# Patient Record
Sex: FEMALE | Race: WHITE | NOT HISPANIC OR LATINO
[De-identification: names, ages, dates, MRNs, and addresses within clinical notes are randomized per-mention and may not be internally consistent; named-entity substitution may affect disease eponyms.]

---

## 2017-01-05 ENCOUNTER — APPOINTMENT (OUTPATIENT)
Dept: ENDOCRINOLOGY | Facility: CLINIC | Age: 67
End: 2017-01-05

## 2017-01-05 VITALS
HEART RATE: 87 BPM | DIASTOLIC BLOOD PRESSURE: 74 MMHG | SYSTOLIC BLOOD PRESSURE: 133 MMHG | BODY MASS INDEX: 32.06 KG/M2 | WEIGHT: 181 LBS

## 2017-01-05 DIAGNOSIS — R94.6 ABNORMAL RESULTS OF THYROID FUNCTION STUDIES: ICD-10-CM

## 2017-01-05 RX ORDER — DEXAMETHASONE 1 MG/1
1 TABLET ORAL
Qty: 1 | Refills: 0 | Status: ACTIVE | COMMUNITY
Start: 2017-01-05 | End: 1900-01-01

## 2017-03-02 ENCOUNTER — RX RENEWAL (OUTPATIENT)
Age: 67
End: 2017-03-02

## 2017-03-03 ENCOUNTER — RX RENEWAL (OUTPATIENT)
Age: 67
End: 2017-03-03

## 2017-04-17 ENCOUNTER — RX RENEWAL (OUTPATIENT)
Age: 67
End: 2017-04-17

## 2017-04-25 ENCOUNTER — RX RENEWAL (OUTPATIENT)
Age: 67
End: 2017-04-25

## 2017-08-07 ENCOUNTER — RX RENEWAL (OUTPATIENT)
Age: 67
End: 2017-08-07

## 2017-08-10 ENCOUNTER — CHART COPY (OUTPATIENT)
Age: 67
End: 2017-08-10

## 2017-09-25 ENCOUNTER — RX RENEWAL (OUTPATIENT)
Age: 67
End: 2017-09-25

## 2017-10-25 ENCOUNTER — CHART COPY (OUTPATIENT)
Age: 67
End: 2017-10-25

## 2017-10-26 ENCOUNTER — CHART COPY (OUTPATIENT)
Age: 67
End: 2017-10-26

## 2017-10-26 ENCOUNTER — RX RENEWAL (OUTPATIENT)
Age: 67
End: 2017-10-26

## 2017-11-02 ENCOUNTER — RX RENEWAL (OUTPATIENT)
Age: 67
End: 2017-11-02

## 2017-12-21 ENCOUNTER — APPOINTMENT (OUTPATIENT)
Dept: ENDOCRINOLOGY | Facility: CLINIC | Age: 67
End: 2017-12-21
Payer: COMMERCIAL

## 2017-12-21 VITALS
WEIGHT: 180 LBS | HEIGHT: 63 IN | SYSTOLIC BLOOD PRESSURE: 132 MMHG | DIASTOLIC BLOOD PRESSURE: 85 MMHG | BODY MASS INDEX: 31.89 KG/M2 | HEART RATE: 82 BPM

## 2017-12-21 DIAGNOSIS — R63.5 ABNORMAL WEIGHT GAIN: ICD-10-CM

## 2017-12-21 DIAGNOSIS — L65.9 NONSCARRING HAIR LOSS, UNSPECIFIED: ICD-10-CM

## 2017-12-21 DIAGNOSIS — E03.9 HYPOTHYROIDISM, UNSPECIFIED: ICD-10-CM

## 2017-12-21 PROCEDURE — 99214 OFFICE O/P EST MOD 30 MIN: CPT | Mod: 25

## 2017-12-21 PROCEDURE — 83036 HEMOGLOBIN GLYCOSYLATED A1C: CPT | Mod: QW

## 2017-12-21 PROCEDURE — 82962 GLUCOSE BLOOD TEST: CPT

## 2017-12-21 RX ORDER — METFORMIN HYDROCHLORIDE 500 MG/1
500 TABLET, FILM COATED, EXTENDED RELEASE ORAL
Qty: 360 | Refills: 3 | Status: DISCONTINUED | COMMUNITY
Start: 2017-03-02 | End: 2017-12-21

## 2017-12-21 RX ORDER — METFORMIN ER 500 MG 500 MG/1
500 TABLET ORAL
Qty: 360 | Refills: 3 | Status: DISCONTINUED | COMMUNITY
Start: 2017-04-25 | End: 2017-12-21

## 2018-03-12 ENCOUNTER — RX RENEWAL (OUTPATIENT)
Age: 68
End: 2018-03-12

## 2018-12-24 ENCOUNTER — RX RENEWAL (OUTPATIENT)
Age: 68
End: 2018-12-24

## 2019-01-19 ENCOUNTER — RX RENEWAL (OUTPATIENT)
Age: 69
End: 2019-01-19

## 2019-02-04 ENCOUNTER — RX RENEWAL (OUTPATIENT)
Age: 69
End: 2019-02-04

## 2020-01-15 ENCOUNTER — RX RENEWAL (OUTPATIENT)
Age: 70
End: 2020-01-15

## 2020-02-12 ENCOUNTER — RX RENEWAL (OUTPATIENT)
Age: 70
End: 2020-02-12

## 2020-02-28 ENCOUNTER — RX RENEWAL (OUTPATIENT)
Age: 70
End: 2020-02-28

## 2020-02-28 RX ORDER — METFORMIN HYDROCHLORIDE 500 MG/1
500 TABLET, FILM COATED, EXTENDED RELEASE ORAL
Qty: 360 | Refills: 3 | Status: ACTIVE | COMMUNITY
Start: 2017-10-26 | End: 1900-01-01

## 2021-01-14 ENCOUNTER — TRANSCRIPTION ENCOUNTER (OUTPATIENT)
Age: 71
End: 2021-01-14

## 2021-01-20 ENCOUNTER — RX RENEWAL (OUTPATIENT)
Age: 71
End: 2021-01-20

## 2021-02-11 ENCOUNTER — TRANSCRIPTION ENCOUNTER (OUTPATIENT)
Age: 71
End: 2021-02-11

## 2024-03-25 ENCOUNTER — APPOINTMENT (OUTPATIENT)
Dept: OTOLARYNGOLOGY | Facility: CLINIC | Age: 74
End: 2024-03-25

## 2024-04-08 ENCOUNTER — APPOINTMENT (OUTPATIENT)
Dept: ORTHOPEDIC SURGERY | Facility: CLINIC | Age: 74
End: 2024-04-08
Payer: MEDICARE

## 2024-04-08 VITALS
DIASTOLIC BLOOD PRESSURE: 84 MMHG | SYSTOLIC BLOOD PRESSURE: 132 MMHG | WEIGHT: 170 LBS | HEART RATE: 86 BPM | BODY MASS INDEX: 31.28 KG/M2 | HEIGHT: 62 IN

## 2024-04-08 DIAGNOSIS — M25.551 PAIN IN RIGHT HIP: ICD-10-CM

## 2024-04-08 PROCEDURE — 73502 X-RAY EXAM HIP UNI 2-3 VIEWS: CPT

## 2024-04-08 PROCEDURE — 99204 OFFICE O/P NEW MOD 45 MIN: CPT

## 2024-04-10 NOTE — DISCUSSION/SUMMARY
[de-identified] : PT is indicated for JOSE.  Pt wishes to undergo the surgery in May.  DA vs post approach discussed.  She understands she will be getting a posterior approach.  she will attend joint class  Benefits and alternatives of the procedure discussed with the patient in detail. Risks including but not limited to bleeding, infection, damage to nerves, damage to tissues, damage to blood vessels, nonunion, malunion, post surgical stiffness, need for future surgery, DVT, PE, loss of limb and loss of life were explained.  Benefits of surgery include anatomic restoration  of length alignment and rotation, and best chance for better long term functional outcomes. The patient understands the risks and benefits and wishes to undergo surgery. They will be scheduled appropriately.

## 2024-04-10 NOTE — HISTORY OF PRESENT ILLNESS
[de-identified] : Patient is a 74 year-old female who presents to the office today for initial evaluation of right hip pain. Works as a . She has been complaining of right hip pain for many years. She notes that in the absence of injury or inciting event, she has had a significant increase in pain in the hip over the past 2 months. Pain is located in the groin and also in the posterior aspect of the hip/lower back. She notes that she has trouble ambulating due to the pain and has a limp. She is currently taking Diclofenac which has not been helping at all. The Tylenol/Advil combination medication helped her symptoms a little bit. She presents on referral from Dr. Giron for right hip pain consultation.

## 2024-04-10 NOTE — PHYSICAL EXAM
[de-identified] : + ttp at R groin and GT.  AMbulates w antalgic gait.  Painful and limited IR.  limited but painless ER.  Able to SLR and abduct w equalk strength b/l.  NVI [de-identified] : 2 views of the R hip show severe R hip arthritis with complete loss of joint space and osteophyte formation

## 2024-05-06 ENCOUNTER — OUTPATIENT (OUTPATIENT)
Dept: OUTPATIENT SERVICES | Facility: HOSPITAL | Age: 74
LOS: 1 days | End: 2024-05-06
Payer: MEDICARE

## 2024-05-06 VITALS
RESPIRATION RATE: 16 BRPM | SYSTOLIC BLOOD PRESSURE: 116 MMHG | HEIGHT: 62 IN | HEART RATE: 81 BPM | DIASTOLIC BLOOD PRESSURE: 79 MMHG | TEMPERATURE: 99 F | WEIGHT: 171.08 LBS | OXYGEN SATURATION: 96 %

## 2024-05-06 DIAGNOSIS — Z98.890 OTHER SPECIFIED POSTPROCEDURAL STATES: Chronic | ICD-10-CM

## 2024-05-06 DIAGNOSIS — E11.9 TYPE 2 DIABETES MELLITUS WITHOUT COMPLICATIONS: ICD-10-CM

## 2024-05-06 DIAGNOSIS — I10 ESSENTIAL (PRIMARY) HYPERTENSION: ICD-10-CM

## 2024-05-06 DIAGNOSIS — Z86.39 PERSONAL HISTORY OF OTHER ENDOCRINE, NUTRITIONAL AND METABOLIC DISEASE: ICD-10-CM

## 2024-05-06 DIAGNOSIS — M16.11 UNILATERAL PRIMARY OSTEOARTHRITIS, RIGHT HIP: ICD-10-CM

## 2024-05-06 DIAGNOSIS — J30.2 OTHER SEASONAL ALLERGIC RHINITIS: ICD-10-CM

## 2024-05-06 LAB
ANION GAP SERPL CALC-SCNC: 14 MMOL/L — SIGNIFICANT CHANGE UP (ref 5–17)
BLD GP AB SCN SERPL QL: NEGATIVE — SIGNIFICANT CHANGE UP
BUN SERPL-MCNC: 18 MG/DL — SIGNIFICANT CHANGE UP (ref 7–23)
CALCIUM SERPL-MCNC: 10.7 MG/DL — HIGH (ref 8.4–10.5)
CHLORIDE SERPL-SCNC: 102 MMOL/L — SIGNIFICANT CHANGE UP (ref 96–108)
CO2 SERPL-SCNC: 23 MMOL/L — SIGNIFICANT CHANGE UP (ref 22–31)
CREAT SERPL-MCNC: 0.84 MG/DL — SIGNIFICANT CHANGE UP (ref 0.5–1.3)
EGFR: 74 ML/MIN/1.73M2 — SIGNIFICANT CHANGE UP
GLUCOSE SERPL-MCNC: 96 MG/DL — SIGNIFICANT CHANGE UP (ref 70–99)
HCT VFR BLD CALC: 44.8 % — SIGNIFICANT CHANGE UP (ref 34.5–45)
HGB BLD-MCNC: 14.8 G/DL — SIGNIFICANT CHANGE UP (ref 11.5–15.5)
MCHC RBC-ENTMCNC: 29.5 PG — SIGNIFICANT CHANGE UP (ref 27–34)
MCHC RBC-ENTMCNC: 33 GM/DL — SIGNIFICANT CHANGE UP (ref 32–36)
MCV RBC AUTO: 89.2 FL — SIGNIFICANT CHANGE UP (ref 80–100)
NRBC # BLD: 0 /100 WBCS — SIGNIFICANT CHANGE UP (ref 0–0)
PLATELET # BLD AUTO: 233 K/UL — SIGNIFICANT CHANGE UP (ref 150–400)
POTASSIUM SERPL-MCNC: 4.7 MMOL/L — SIGNIFICANT CHANGE UP (ref 3.5–5.3)
POTASSIUM SERPL-SCNC: 4.7 MMOL/L — SIGNIFICANT CHANGE UP (ref 3.5–5.3)
RBC # BLD: 5.02 M/UL — SIGNIFICANT CHANGE UP (ref 3.8–5.2)
RBC # FLD: 11.9 % — SIGNIFICANT CHANGE UP (ref 10.3–14.5)
RH IG SCN BLD-IMP: POSITIVE — SIGNIFICANT CHANGE UP
SODIUM SERPL-SCNC: 139 MMOL/L — SIGNIFICANT CHANGE UP (ref 135–145)
WBC # BLD: 10.17 K/UL — SIGNIFICANT CHANGE UP (ref 3.8–10.5)
WBC # FLD AUTO: 10.17 K/UL — SIGNIFICANT CHANGE UP (ref 3.8–10.5)

## 2024-05-06 RX ORDER — CEFAZOLIN SODIUM 1 G
2000 VIAL (EA) INJECTION ONCE
Refills: 0 | Status: COMPLETED | OUTPATIENT
Start: 2024-05-14 | End: 2024-05-14

## 2024-05-06 RX ORDER — PANTOPRAZOLE SODIUM 20 MG/1
40 TABLET, DELAYED RELEASE ORAL ONCE
Refills: 0 | Status: COMPLETED | OUTPATIENT
Start: 2024-05-14 | End: 2024-05-14

## 2024-05-06 RX ORDER — TRAMADOL HYDROCHLORIDE 50 MG/1
50 TABLET ORAL ONCE
Refills: 0 | Status: DISCONTINUED | OUTPATIENT
Start: 2024-05-14 | End: 2024-05-14

## 2024-05-06 RX ORDER — CHLORHEXIDINE GLUCONATE 213 G/1000ML
1 SOLUTION TOPICAL ONCE
Refills: 0 | Status: DISCONTINUED | OUTPATIENT
Start: 2024-05-14 | End: 2024-05-14

## 2024-05-06 RX ORDER — SODIUM CHLORIDE 9 MG/ML
3 INJECTION INTRAMUSCULAR; INTRAVENOUS; SUBCUTANEOUS EVERY 8 HOURS
Refills: 0 | Status: DISCONTINUED | OUTPATIENT
Start: 2024-05-14 | End: 2024-05-14

## 2024-05-06 RX ORDER — LIDOCAINE HCL 20 MG/ML
0.2 VIAL (ML) INJECTION ONCE
Refills: 0 | Status: DISCONTINUED | OUTPATIENT
Start: 2024-05-14 | End: 2024-05-14

## 2024-05-06 NOTE — H&P PST ADULT - PROBLEM SELECTOR PLAN 1
scheduled right total hip arthroplasty, posterior approach on 5/14/2024.  -preop and chlorhexidine instructions given  -Labs: CBC, BMP, A1c, MRSA, T&S done in PST  -Requesting M/E from Dr. Finn Giron, appt scheduled on 5/7 for cardiac testing with PCP/CArd

## 2024-05-06 NOTE — H&P PST ADULT - ASSESSMENT
DASI score: 7  DASI activity: Pt states she is able to walk 1-2 blocks, can climb 1-2 FOS w/oit s/s of C/P or SOB  Loose teeth or denture: denies

## 2024-05-06 NOTE — H&P PST ADULT - NSICDXPASTMEDICALHX_GEN_ALL_CORE_FT
PAST MEDICAL HISTORY:  Anxiety     DM (diabetes mellitus), type 2     H/O Hashimoto thyroiditis     History of glaucoma     HTN (hypertension)     Seasonal allergies

## 2024-05-06 NOTE — H&P PST ADULT - HISTORY OF PRESENT ILLNESS
74 yr old female with PMH of  74 yr old female with PMH of HTN, DM type 2, Hashimoto's thyroiditis, seasonal allergies, anxiety. Pt reports right hip pain ~2 yr, pain with sitting long period of time, and standing. Pain scale 9/10, with OTC use 6/10. Pt evaluated by Dr. Hayward for a scheduled right total hip arthroplasty, posterior approach on 5/14/2024. Pt denies fevers, chills, h/a, c/p or SOB at this time.

## 2024-05-07 LAB
A1C WITH ESTIMATED AVERAGE GLUCOSE RESULT: 6.9 % — HIGH (ref 4–5.6)
ESTIMATED AVERAGE GLUCOSE: 151 MG/DL — HIGH (ref 68–114)
MRSA PCR RESULT.: SIGNIFICANT CHANGE UP
S AUREUS DNA NOSE QL NAA+PROBE: SIGNIFICANT CHANGE UP

## 2024-05-13 ENCOUNTER — TRANSCRIPTION ENCOUNTER (OUTPATIENT)
Age: 74
End: 2024-05-13

## 2024-05-14 ENCOUNTER — RESULT REVIEW (OUTPATIENT)
Age: 74
End: 2024-05-14

## 2024-05-14 ENCOUNTER — OUTPATIENT (OUTPATIENT)
Dept: INPATIENT UNIT | Facility: HOSPITAL | Age: 74
LOS: 1 days | End: 2024-05-14
Payer: MEDICARE

## 2024-05-14 ENCOUNTER — APPOINTMENT (OUTPATIENT)
Dept: ORTHOPEDIC SURGERY | Facility: HOSPITAL | Age: 74
End: 2024-05-14

## 2024-05-14 ENCOUNTER — TRANSCRIPTION ENCOUNTER (OUTPATIENT)
Age: 74
End: 2024-05-14

## 2024-05-14 VITALS
HEIGHT: 61.81 IN | OXYGEN SATURATION: 97 % | RESPIRATION RATE: 18 BRPM | DIASTOLIC BLOOD PRESSURE: 77 MMHG | SYSTOLIC BLOOD PRESSURE: 122 MMHG | WEIGHT: 171.08 LBS | TEMPERATURE: 97 F | HEART RATE: 82 BPM

## 2024-05-14 DIAGNOSIS — Z98.890 OTHER SPECIFIED POSTPROCEDURAL STATES: Chronic | ICD-10-CM

## 2024-05-14 DIAGNOSIS — M16.11 UNILATERAL PRIMARY OSTEOARTHRITIS, RIGHT HIP: ICD-10-CM

## 2024-05-14 LAB
GLUCOSE BLDC GLUCOMTR-MCNC: 133 MG/DL — HIGH (ref 70–99)
GLUCOSE BLDC GLUCOMTR-MCNC: 165 MG/DL — HIGH (ref 70–99)
GLUCOSE BLDC GLUCOMTR-MCNC: 169 MG/DL — HIGH (ref 70–99)
GLUCOSE BLDC GLUCOMTR-MCNC: 286 MG/DL — HIGH (ref 70–99)
RH IG SCN BLD-IMP: POSITIVE — SIGNIFICANT CHANGE UP

## 2024-05-14 PROCEDURE — 86901 BLOOD TYPING SEROLOGIC RH(D): CPT

## 2024-05-14 PROCEDURE — 86850 RBC ANTIBODY SCREEN: CPT

## 2024-05-14 PROCEDURE — 83036 HEMOGLOBIN GLYCOSYLATED A1C: CPT

## 2024-05-14 PROCEDURE — G0463: CPT

## 2024-05-14 PROCEDURE — 86900 BLOOD TYPING SEROLOGIC ABO: CPT

## 2024-05-14 PROCEDURE — 87640 STAPH A DNA AMP PROBE: CPT

## 2024-05-14 PROCEDURE — 72170 X-RAY EXAM OF PELVIS: CPT

## 2024-05-14 PROCEDURE — 80048 BASIC METABOLIC PNL TOTAL CA: CPT

## 2024-05-14 PROCEDURE — 87641 MR-STAPH DNA AMP PROBE: CPT

## 2024-05-14 PROCEDURE — 72170 X-RAY EXAM OF PELVIS: CPT | Mod: 26

## 2024-05-14 PROCEDURE — 85027 COMPLETE CBC AUTOMATED: CPT

## 2024-05-14 DEVICE — HEAD DELTA CER V40 36 PLUS 5: Type: IMPLANTABLE DEVICE | Site: RIGHT | Status: FUNCTIONAL

## 2024-05-14 DEVICE — LINER RIM ELEVATED TRIDENT X3 5.9X36MM: Type: IMPLANTABLE DEVICE | Site: RIGHT | Status: FUNCTIONAL

## 2024-05-14 DEVICE — SHELL ACET TRIDENT II E 52MM: Type: IMPLANTABLE DEVICE | Site: RIGHT | Status: FUNCTIONAL

## 2024-05-14 DEVICE — SCREW HEX LO PROF 6.5X25MM: Type: IMPLANTABLE DEVICE | Site: RIGHT | Status: FUNCTIONAL

## 2024-05-14 DEVICE — STEM ACC V40 127 DEG SZ 5 35X108MM 127 DEG: Type: IMPLANTABLE DEVICE | Site: RIGHT | Status: FUNCTIONAL

## 2024-05-14 RX ORDER — MAGNESIUM HYDROXIDE 400 MG/1
30 TABLET, CHEWABLE ORAL DAILY
Refills: 0 | Status: DISCONTINUED | OUTPATIENT
Start: 2024-05-14 | End: 2024-05-15

## 2024-05-14 RX ORDER — MONTELUKAST 4 MG/1
10 TABLET, CHEWABLE ORAL DAILY
Refills: 0 | Status: DISCONTINUED | OUTPATIENT
Start: 2024-05-14 | End: 2024-05-15

## 2024-05-14 RX ORDER — DEXTROSE 50 % IN WATER 50 %
25 SYRINGE (ML) INTRAVENOUS ONCE
Refills: 0 | Status: DISCONTINUED | OUTPATIENT
Start: 2024-05-14 | End: 2024-05-15

## 2024-05-14 RX ORDER — ALBUTEROL 90 UG/1
2 AEROSOL, METERED ORAL EVERY 4 HOURS
Refills: 0 | Status: DISCONTINUED | OUTPATIENT
Start: 2024-05-14 | End: 2024-05-15

## 2024-05-14 RX ORDER — ALBUTEROL 90 UG/1
2 AEROSOL, METERED ORAL
Refills: 0 | DISCHARGE

## 2024-05-14 RX ORDER — TIMOLOL 0.5 %
1 DROPS OPHTHALMIC (EYE)
Refills: 0 | DISCHARGE

## 2024-05-14 RX ORDER — OXYCODONE HYDROCHLORIDE 5 MG/1
5 TABLET ORAL EVERY 4 HOURS
Refills: 0 | Status: DISCONTINUED | OUTPATIENT
Start: 2024-05-14 | End: 2024-05-15

## 2024-05-14 RX ORDER — INSULIN LISPRO 100/ML
VIAL (ML) SUBCUTANEOUS AT BEDTIME
Refills: 0 | Status: DISCONTINUED | OUTPATIENT
Start: 2024-05-14 | End: 2024-05-15

## 2024-05-14 RX ORDER — SODIUM CHLORIDE 9 MG/ML
500 INJECTION, SOLUTION INTRAVENOUS ONCE
Refills: 0 | Status: DISCONTINUED | OUTPATIENT
Start: 2024-05-14 | End: 2024-05-14

## 2024-05-14 RX ORDER — AMLODIPINE BESYLATE 2.5 MG/1
10 TABLET ORAL DAILY
Refills: 0 | Status: DISCONTINUED | OUTPATIENT
Start: 2024-05-14 | End: 2024-05-15

## 2024-05-14 RX ORDER — ONDANSETRON 8 MG/1
4 TABLET, FILM COATED ORAL ONCE
Refills: 0 | Status: DISCONTINUED | OUTPATIENT
Start: 2024-05-14 | End: 2024-05-14

## 2024-05-14 RX ORDER — SENNA PLUS 8.6 MG/1
2 TABLET ORAL AT BEDTIME
Refills: 0 | Status: DISCONTINUED | OUTPATIENT
Start: 2024-05-14 | End: 2024-05-15

## 2024-05-14 RX ORDER — LEVOTHYROXINE SODIUM 125 MCG
100 TABLET ORAL DAILY
Refills: 0 | Status: DISCONTINUED | OUTPATIENT
Start: 2024-05-14 | End: 2024-05-15

## 2024-05-14 RX ORDER — DEXTROSE 50 % IN WATER 50 %
15 SYRINGE (ML) INTRAVENOUS ONCE
Refills: 0 | Status: DISCONTINUED | OUTPATIENT
Start: 2024-05-14 | End: 2024-05-15

## 2024-05-14 RX ORDER — SODIUM CHLORIDE 9 MG/ML
500 INJECTION INTRAMUSCULAR; INTRAVENOUS; SUBCUTANEOUS ONCE
Refills: 0 | Status: COMPLETED | OUTPATIENT
Start: 2024-05-14 | End: 2024-05-14

## 2024-05-14 RX ORDER — ERTUGLIFLOZIN AND METFORMIN HYDROCHLORIDE 7.5; 1 MG/1; MG/1
1 TABLET, FILM COATED ORAL
Refills: 0 | DISCHARGE

## 2024-05-14 RX ORDER — OXYCODONE HYDROCHLORIDE 5 MG/1
10 TABLET ORAL
Refills: 0 | Status: DISCONTINUED | OUTPATIENT
Start: 2024-05-14 | End: 2024-05-14

## 2024-05-14 RX ORDER — SODIUM CHLORIDE 9 MG/ML
500 INJECTION INTRAMUSCULAR; INTRAVENOUS; SUBCUTANEOUS ONCE
Refills: 0 | Status: COMPLETED | OUTPATIENT
Start: 2024-05-15 | End: 2024-05-15

## 2024-05-14 RX ORDER — OXYCODONE HYDROCHLORIDE 5 MG/1
5 TABLET ORAL
Refills: 0 | Status: DISCONTINUED | OUTPATIENT
Start: 2024-05-14 | End: 2024-05-14

## 2024-05-14 RX ORDER — HYDROMORPHONE HYDROCHLORIDE 2 MG/ML
0.25 INJECTION INTRAMUSCULAR; INTRAVENOUS; SUBCUTANEOUS
Refills: 0 | Status: DISCONTINUED | OUTPATIENT
Start: 2024-05-14 | End: 2024-05-14

## 2024-05-14 RX ORDER — PANTOPRAZOLE SODIUM 20 MG/1
40 TABLET, DELAYED RELEASE ORAL
Refills: 0 | Status: DISCONTINUED | OUTPATIENT
Start: 2024-05-14 | End: 2024-05-15

## 2024-05-14 RX ORDER — SODIUM CHLORIDE 9 MG/ML
1000 INJECTION, SOLUTION INTRAVENOUS
Refills: 0 | Status: DISCONTINUED | OUTPATIENT
Start: 2024-05-14 | End: 2024-05-14

## 2024-05-14 RX ORDER — LEVOTHYROXINE SODIUM 125 MCG
1 TABLET ORAL
Refills: 0 | DISCHARGE

## 2024-05-14 RX ORDER — DEXTROSE 50 % IN WATER 50 %
12.5 SYRINGE (ML) INTRAVENOUS ONCE
Refills: 0 | Status: DISCONTINUED | OUTPATIENT
Start: 2024-05-14 | End: 2024-05-15

## 2024-05-14 RX ORDER — INSULIN LISPRO 100/ML
VIAL (ML) SUBCUTANEOUS
Refills: 0 | Status: DISCONTINUED | OUTPATIENT
Start: 2024-05-14 | End: 2024-05-15

## 2024-05-14 RX ORDER — MONTELUKAST 4 MG/1
1 TABLET, CHEWABLE ORAL
Refills: 0 | DISCHARGE

## 2024-05-14 RX ORDER — OXYCODONE HYDROCHLORIDE 5 MG/1
10 TABLET ORAL EVERY 4 HOURS
Refills: 0 | Status: DISCONTINUED | OUTPATIENT
Start: 2024-05-14 | End: 2024-05-15

## 2024-05-14 RX ORDER — ATORVASTATIN CALCIUM 80 MG/1
80 TABLET, FILM COATED ORAL AT BEDTIME
Refills: 0 | Status: DISCONTINUED | OUTPATIENT
Start: 2024-05-14 | End: 2024-05-15

## 2024-05-14 RX ORDER — ACETAMINOPHEN 500 MG
1000 TABLET ORAL ONCE
Refills: 0 | Status: COMPLETED | OUTPATIENT
Start: 2024-05-14 | End: 2024-05-14

## 2024-05-14 RX ORDER — BIMATOPROST 0.3 MG/ML
1 SOLUTION/ DROPS OPHTHALMIC
Refills: 0 | DISCHARGE

## 2024-05-14 RX ORDER — ONDANSETRON 8 MG/1
4 TABLET, FILM COATED ORAL EVERY 6 HOURS
Refills: 0 | Status: DISCONTINUED | OUTPATIENT
Start: 2024-05-14 | End: 2024-05-15

## 2024-05-14 RX ORDER — ACETAMINOPHEN 500 MG
1000 TABLET ORAL ONCE
Refills: 0 | Status: COMPLETED | OUTPATIENT
Start: 2024-05-15 | End: 2024-05-15

## 2024-05-14 RX ORDER — METOPROLOL TARTRATE 50 MG
50 TABLET ORAL DAILY
Refills: 0 | Status: DISCONTINUED | OUTPATIENT
Start: 2024-05-14 | End: 2024-05-15

## 2024-05-14 RX ORDER — LIOTHYRONINE SODIUM 25 UG/1
1 TABLET ORAL
Refills: 0 | DISCHARGE

## 2024-05-14 RX ORDER — FEXOFENADINE HCL 30 MG
1 TABLET ORAL
Refills: 0 | DISCHARGE

## 2024-05-14 RX ORDER — POLYETHYLENE GLYCOL 3350 17 G/17G
17 POWDER, FOR SOLUTION ORAL AT BEDTIME
Refills: 0 | Status: DISCONTINUED | OUTPATIENT
Start: 2024-05-14 | End: 2024-05-15

## 2024-05-14 RX ORDER — CEFAZOLIN SODIUM 1 G
2000 VIAL (EA) INJECTION EVERY 8 HOURS
Refills: 0 | Status: COMPLETED | OUTPATIENT
Start: 2024-05-14 | End: 2024-05-15

## 2024-05-14 RX ORDER — TRAMADOL HYDROCHLORIDE 50 MG/1
50 TABLET ORAL EVERY 6 HOURS
Refills: 0 | Status: DISCONTINUED | OUTPATIENT
Start: 2024-05-14 | End: 2024-05-15

## 2024-05-14 RX ORDER — METOPROLOL TARTRATE 50 MG
1 TABLET ORAL
Refills: 0 | DISCHARGE

## 2024-05-14 RX ORDER — ROSUVASTATIN CALCIUM 5 MG/1
1 TABLET ORAL
Refills: 0 | DISCHARGE

## 2024-05-14 RX ORDER — DEXTROSE 10 % IN WATER 10 %
125 INTRAVENOUS SOLUTION INTRAVENOUS ONCE
Refills: 0 | Status: DISCONTINUED | OUTPATIENT
Start: 2024-05-14 | End: 2024-05-15

## 2024-05-14 RX ORDER — GLUCAGON INJECTION, SOLUTION 0.5 MG/.1ML
1 INJECTION, SOLUTION SUBCUTANEOUS ONCE
Refills: 0 | Status: DISCONTINUED | OUTPATIENT
Start: 2024-05-14 | End: 2024-05-15

## 2024-05-14 RX ORDER — AMLODIPINE BESYLATE AND OLMESARTRAN MEDOXOMIL 10; 40 MG/1; MG/1
1 TABLET, FILM COATED ORAL
Refills: 0 | DISCHARGE

## 2024-05-14 RX ORDER — SEMAGLUTIDE 0.68 MG/ML
0.5 INJECTION, SOLUTION SUBCUTANEOUS
Refills: 0 | DISCHARGE

## 2024-05-14 RX ORDER — LOSARTAN POTASSIUM 100 MG/1
100 TABLET, FILM COATED ORAL DAILY
Refills: 0 | Status: DISCONTINUED | OUTPATIENT
Start: 2024-05-14 | End: 2024-05-15

## 2024-05-14 RX ORDER — LATANOPROST 0.05 MG/ML
1 SOLUTION/ DROPS OPHTHALMIC; TOPICAL AT BEDTIME
Refills: 0 | Status: DISCONTINUED | OUTPATIENT
Start: 2024-05-14 | End: 2024-05-15

## 2024-05-14 RX ORDER — ASPIRIN/CALCIUM CARB/MAGNESIUM 324 MG
81 TABLET ORAL
Refills: 0 | Status: DISCONTINUED | OUTPATIENT
Start: 2024-05-14 | End: 2024-05-15

## 2024-05-14 RX ORDER — ACETAMINOPHEN 500 MG
1000 TABLET ORAL ONCE
Refills: 0 | Status: DISCONTINUED | OUTPATIENT
Start: 2024-05-15 | End: 2024-05-15

## 2024-05-14 RX ORDER — CLONAZEPAM 1 MG
1 TABLET ORAL
Refills: 0 | DISCHARGE

## 2024-05-14 RX ORDER — TIMOLOL 0.5 %
1 DROPS OPHTHALMIC (EYE) AT BEDTIME
Refills: 0 | Status: DISCONTINUED | OUTPATIENT
Start: 2024-05-14 | End: 2024-05-15

## 2024-05-14 RX ORDER — SODIUM CHLORIDE 9 MG/ML
1000 INJECTION, SOLUTION INTRAVENOUS
Refills: 0 | Status: DISCONTINUED | OUTPATIENT
Start: 2024-05-14 | End: 2024-05-15

## 2024-05-14 RX ORDER — LIOTHYRONINE SODIUM 25 UG/1
5 TABLET ORAL DAILY
Refills: 0 | Status: DISCONTINUED | OUTPATIENT
Start: 2024-05-14 | End: 2024-05-15

## 2024-05-14 RX ORDER — GLIMEPIRIDE 1 MG
1 TABLET ORAL
Refills: 0 | DISCHARGE

## 2024-05-14 RX ADMIN — SODIUM CHLORIDE 75 MILLILITER(S): 9 INJECTION, SOLUTION INTRAVENOUS at 14:53

## 2024-05-14 RX ADMIN — Medication 1000 MILLIGRAM(S): at 22:46

## 2024-05-14 RX ADMIN — Medication 100 MILLIGRAM(S): at 19:47

## 2024-05-14 RX ADMIN — LATANOPROST 1 DROP(S): 0.05 SOLUTION/ DROPS OPHTHALMIC; TOPICAL at 21:28

## 2024-05-14 RX ADMIN — SODIUM CHLORIDE 500 MILLILITER(S): 9 INJECTION INTRAMUSCULAR; INTRAVENOUS; SUBCUTANEOUS at 15:51

## 2024-05-14 RX ADMIN — Medication 400 MILLIGRAM(S): at 21:46

## 2024-05-14 RX ADMIN — HYDROMORPHONE HYDROCHLORIDE 0.25 MILLIGRAM(S): 2 INJECTION INTRAMUSCULAR; INTRAVENOUS; SUBCUTANEOUS at 15:00

## 2024-05-14 RX ADMIN — TRAMADOL HYDROCHLORIDE 50 MILLIGRAM(S): 50 TABLET ORAL at 11:13

## 2024-05-14 RX ADMIN — Medication 81 MILLIGRAM(S): at 17:17

## 2024-05-14 RX ADMIN — SODIUM CHLORIDE 500 MILLILITER(S): 9 INJECTION INTRAMUSCULAR; INTRAVENOUS; SUBCUTANEOUS at 16:49

## 2024-05-14 RX ADMIN — Medication 1: at 22:17

## 2024-05-14 RX ADMIN — PANTOPRAZOLE SODIUM 40 MILLIGRAM(S): 20 TABLET, DELAYED RELEASE ORAL at 11:13

## 2024-05-14 RX ADMIN — ATORVASTATIN CALCIUM 80 MILLIGRAM(S): 80 TABLET, FILM COATED ORAL at 21:46

## 2024-05-14 RX ADMIN — HYDROMORPHONE HYDROCHLORIDE 0.25 MILLIGRAM(S): 2 INJECTION INTRAMUSCULAR; INTRAVENOUS; SUBCUTANEOUS at 15:15

## 2024-05-14 NOTE — PHYSICAL THERAPY INITIAL EVALUATION ADULT - ADDITIONAL COMMENTS
as per pt: PTA pt was living in a PH + Stairs (has approx 4 steps to enter + 14 steps to 2nd floor bedroom) and was independent in all functional mobility and ADL's. has a Rolator for gait. lives with  and family.

## 2024-05-14 NOTE — ASU PREOP CHECKLIST - NS PREOP CHK MONITOR ANESTHESIA CONSENT
Home Oxygen Evaluation    Home Oxygen Evaluation completed. Patient is on 2 liters per minute via Nasal Cannula. Resting SpO2 = 94%  Resting SpO2 on room air = 87%    SpO2 on room air with exercise = 81%  SpO2 on oxygen as above with exercise = 88%    Nocturnal Oximetry with patient on room air is recommended is SpO2 is between 89% and 95% (requires additional order).     Derian Resendiz RCP  10:48 AM done

## 2024-05-14 NOTE — PHYSICAL THERAPY INITIAL EVALUATION ADULT - NSPTDMEREC_GEN_A_CORE
Patient will require a rolling walker at home due to decreased balance, strength and endurance to help complete MRADL's (Mobility related aides for daily living)./rolling walker

## 2024-05-14 NOTE — DISCHARGE NOTE PROVIDER - NSDCMRMEDTOKEN_GEN_ALL_CORE_FT
Advil Dual Action With Acetaminophen 250 mg-125 mg oral tablet: 2 tab(s) orally every 6 hours as needed for  severe pain  Albuterol (Eqv-Proventil HFA) 90 mcg/inh inhalation aerosol: 2 puff(s) inhaled every 4 hours as needed for  bronchospasm  Allegra 180 mg oral tablet: 1 tab(s) orally once a day as needed for  allergy symptoms  amlodipine-olmesartan 10 mg-40 mg oral tablet: 1 tab(s) orally once a day  clonazePAM 0.5 mg oral tablet: 1 tab(s) orally 2 times a day as needed for  anxiety  diclofenac sodium 75 mg oral delayed release tablet: 1 tab(s) orally 2 times a day as needed for  severe pain  glimepiride 2 mg oral tablet: 1 tab(s) orally once a day  levothyroxine 100 mcg (0.1 mg) oral tablet: 1 tab(s) orally once a day  liothyronine 5 mcg oral tablet: 1 tab(s) orally once a day  Lumigan 0.01% ophthalmic solution: 1 drop(s) in each affected eye once a day (at bedtime)  metoprolol succinate 50 mg oral tablet, extended release: 1 tab(s) orally once a day  montelukast 10 mg oral tablet: 1 tab(s) orally once a day  rosuvastatin 20 mg oral tablet: 1 tab(s) orally once a day  Segluromet 7.5 mg-1000 mg oral tablet: 1 tab(s) orally once a day  semaglutide 0.5 mg/0.5 mL (0.5 mg dose) subcutaneous solution: 0.5 milligram(s) subcutaneously once a week on Sundays  Timoptic in Ocudose 0.5% preservative-free ophthalmic solution: 1 drop(s) in each affected eye once a day   Advil Dual Action With Acetaminophen 250 mg-125 mg oral tablet: 2 tab(s) orally every 6 hours as needed for fever, H/A, mild pain  Albuterol (Eqv-Proventil HFA) 90 mcg/inh inhalation aerosol: 2 puff(s) inhaled every 4 hours as needed for  bronchospasm  Allegra 180 mg oral tablet: 1 tab(s) orally once a day as needed for  allergy symptoms  amlodipine-olmesartan 10 mg-40 mg oral tablet: 1 tab(s) orally once a day  aspirin 81 mg oral delayed release tablet: 1 tab(s) orally 2 times a day antiplatelet therapy for 1 month post op MDD: 2  clonazePAM 0.5 mg oral tablet: 1 tab(s) orally 2 times a day as needed for  anxiety  diclofenac sodium 75 mg oral delayed release tablet: 1 tab(s) orally 2 times a day as needed for  moderate pain  glimepiride 2 mg oral tablet: 1 tab(s) orally once a day  levothyroxine 100 mcg (0.1 mg) oral tablet: 1 tab(s) orally once a day  liothyronine 5 mcg oral tablet: 1 tab(s) orally once a day  Lumigan 0.01% ophthalmic solution: 1 drop(s) in each affected eye once a day (at bedtime)  metoprolol succinate 50 mg oral tablet, extended release: 1 tab(s) orally once a day  montelukast 10 mg oral tablet: 1 tab(s) orally once a day  oxyCODONE 5 mg oral tablet: 1 tab(s) orally every 4 hours as needed for  severe pain MDD: 6  pantoprazole 40 mg oral delayed release tablet: 1 tab(s) orally once a day (before a meal) MDD: 1  rosuvastatin 20 mg oral tablet: 1 tab(s) orally once a day  Segluromet 7.5 mg-1000 mg oral tablet: 1 tab(s) orally once a day  semaglutide 0.5 mg/0.5 mL (0.5 mg dose) subcutaneous solution: 0.5 milligram(s) subcutaneously once a week on Sundays  Timoptic in Ocudose 0.5% preservative-free ophthalmic solution: 1 drop(s) in each affected eye once a day

## 2024-05-14 NOTE — DISCHARGE NOTE PROVIDER - NSDCFUADDAPPT_GEN_ALL_CORE_FT
Follow up with your primary care provider within 1-2 months of hospital discharge to discuss your recent surgery and any change to your medications.

## 2024-05-14 NOTE — DISCHARGE NOTE PROVIDER - NSDCFUSCHEDAPPT_GEN_ALL_CORE_FT
Deon Hayward  [facility]  ORTHOSURG [address]  Scheduled Appointment: 05/28/2024

## 2024-05-14 NOTE — DISCHARGE NOTE PROVIDER - CARE PROVIDER_API CALL
Deon Hayward  Orthopaedic Surgery  [address], [address]  [place] [address]  Phone: [phone]  Fax: [phone]  Established Patient  Scheduled Appointment: 05/28/2024

## 2024-05-14 NOTE — CONSULT NOTE ADULT - SUBJECTIVE AND OBJECTIVE BOX
74 yr old female with PMH of HTN, DM type 2, Hashimoto's thyroiditis, seasonal allergies, anxiety. Pt reports right hip pain ~2 yr, pain with sitting long period of time, and standing. Pain scale 9/10, with OTC use 6/10. Pt evaluated by Dr. Hayward for a scheduled right total hip arthroplasty, posterior approach on 5/14/2024. Pt denies fevers, chills, h/a, c/p or SOB at this time.   PMHx- HTN, Dm       MEDICATIONS  (STANDING):  chlorhexidine 2% Cloths 1 Application(s) Topical once  lidocaine 1% Injectable 0.2 milliLiter(s) Local Injection once  pantoprazole    Tablet 40 milliGRAM(s) Oral once  sodium chloride 0.9% lock flush 3 milliLiter(s) IV Push every 8 hours  traMADol 50 milliGRAM(s) Oral once    MEDICATIONS  (PRN):            Allergies    sulfa drugs (Rash; Urticaria; Hives)  Betadine (Rash; Urticaria)    Intolerances        REVIEW OF SYSTEMS:  CARDIOVASCULAR: No chest pain, palpitations, dizziness, or leg swelling; no shortness of breath     RESPIRATORY: No cough, wheezing, chills or hemoptysis; No shortness of breath    GASTROINTESTINAL: No abdominal or epigastric pain. No nausea, vomiting, or hematemesis; No diarrhea or constipation. No melena or hematochezia.    NEUROLOGICAL: No headaches, memory loss, loss of strength, numbness      PHYSICAL EXAM:  Vital Signs Last 24 Hrs  T(C): 36.3 (14 May 2024 10:09), Max: 36.3 (14 May 2024 10:09)  T(F): 97.3 (14 May 2024 10:09), Max: 97.3 (14 May 2024 10:09)  HR: 82 (14 May 2024 10:09) (82 - 82)  BP: 122/77 (14 May 2024 10:09) (122/77 - 122/77)  BP(mean): --  RR: 18 (14 May 2024 10:09) (18 - 18)  SpO2: 97% (14 May 2024 10:09) (97% - 97%)        GENERAL: NAD, well-groomed, well-developed  HEAD:  Atraumatic, Normocephalic  EYES: EOMI, PERRLA, conjunctiva and sclera clear  NECK: Supple, No JVD, Normal thyroid  NERVOUS SYSTEM:  Alert & Oriented X3, Good concentration;  and symmetric  CHEST/LUNG: Clear to auscultation bilaterally; No rales, rhonchi, wheezing, or rubs  HEART: S1S2 regular, without murmur, rub nor gallop  ABDOMEN: Soft, Nontender, Nondistended; Bowel sounds present  EXTREMITIES:  2+ Peripheral Pulses, No clubbing, cyanosis, or edema      LABS:  Neg Pharm stress as out patient  See PAST / Medical clearance        \      Plan:   For OR today    Care Discussed with Consultants/Other Providers: D/W Dr Hayward

## 2024-05-14 NOTE — PHYSICAL THERAPY INITIAL EVALUATION ADULT - PERTINENT HX OF CURRENT PROBLEM, REHAB EVAL
74 yr old female with PMH of HTN, DM type 2, Hashimoto's thyroiditis, seasonal allergies, anxiety. Pt reports right hip pain ~2 yr, pain with sitting long period of time, and standing. Pain scale 9/10, with OTC use 6/10. Pt evaluated by Dr. Hayward for right total hip arthroplasty, posterior approach on 5/14/2024.

## 2024-05-14 NOTE — PHYSICAL THERAPY INITIAL EVALUATION ADULT - PERSONAL SAFETY AND JUDGMENT, REHAB EVAL
intact On Enfortumab Vedotin and Pembrolizumab. Mets to lung.  L ureteral stent, c/b worsenening L hydro of non-functioning kidney  - urology consulted, no intervention  - 1 unit PRBC transfusion On Enfortumab Vedotin and Pembrolizumab. Mets to lung.  L ureteral stent, c/b worsenening L hydro of non-functioning kidney  - urology consulted, no intervention  - 1 unit PRBC transfusion

## 2024-05-14 NOTE — DISCHARGE NOTE PROVIDER - HOSPITAL COURSE
History of Present Illness:   74 yr old female with PMH of HTN, DM type 2, Hashimoto's thyroiditis, seasonal allergies, anxiety. Pt reports right hip pain ~2 yr, pain with sitting long period of time, and standing. Pain scale 9/10, with OTC use 6/10. Pt evaluated by Dr. Hayward for a scheduled right total hip arthroplasty, posterior approach on 5/14/2024. Pt denies fevers, chills, h/a, c/p or SOB at this time.     Goals of Care: I want to walk without pain    PAST MEDICAL HISTORY:  Anxiety   DM (diabetes mellitus), type 2   H/O Hashimoto thyroiditis   History of glaucoma   HTN (hypertension)   Seasonal allergies.     PAST SURGICAL HISTORY:  S/P arthroscopy   S/P colonoscopy.    Hospital Course:  Patient was admitted on 5/14. Hospitalist was consulted preoperatively for medical comanagement during this hospital admission.     After receiving pre-operative parenteral prophylactic antibiotics, the patient underwent an uncomplicated Right total hip replacement with Dr. Hayward on 5/14. Patient tolerated the procedure well and was transferred to the recovery room in stable condition, with a stable neuro/vascular exam of the operated extremity.    Patient was placed on Aspirin 81 mg for DVT ppx, and was placed on Protonix 40 mg for GI protection.   Patient was made weight bearing as tolerated with the operative leg.     Typical Physical & occupational therapy modalities post surgery were performed by physical and occupational therapies, including ambulation training, range of motion, ADL's, abd transfers.  After progression of mobility guided by the PT/ OT staff,  the patient was felt to benefit from further rehabilitative care for restoration to level of function. This was felt to best be accomplished at home with home PT.   Discharge and Orthopedic Care instructions were delineated in the Discharge Plan and reviewed with the patient. All medications were delineated in the medication reconciliation tool and key points were reviewed with the patient. They were deemed stable from an Orthopedic & medical standpoint for discharge ***. History of Present Illness:   74 yr old female with PMH of HTN, DM type 2, Hashimoto's thyroiditis, seasonal allergies, anxiety. Pt reports right hip pain ~2 yr, pain with sitting long period of time, and standing. Pain scale 9/10, with OTC use 6/10. Pt evaluated by Dr. Hayward for a scheduled right total hip arthroplasty, posterior approach on 5/14/2024. Pt denies fevers, chills, h/a, c/p or SOB at this time.     Goals of Care: I want to walk without pain    PAST MEDICAL HISTORY:  Anxiety   DM (diabetes mellitus), type 2   H/O Hashimoto thyroiditis   History of glaucoma   HTN (hypertension)   Seasonal allergies.     PAST SURGICAL HISTORY:  S/P arthroscopy   S/P colonoscopy.    Hospital Course:  Patient was admitted on 5/14. Hospitalist was consulted preoperatively for medical comanagement during this hospital admission.     After receiving pre-operative parenteral prophylactic antibiotics, the patient underwent an uncomplicated Right total hip replacement with Dr. Hayward on 5/14. Patient tolerated the procedure well and was transferred to the recovery room in stable condition, with a stable neuro/vascular exam of the operated extremity.    Patient was placed on Aspirin 81 mg for DVT ppx, and was placed on Protonix 40 mg for GI protection.   Patient was made weight bearing as tolerated with the operative leg.     Typical Physical & occupational therapy modalities post surgery were performed by physical and occupational therapies, including ambulation training, range of motion, ADL's, abd transfers.  After progression of mobility guided by the PT/ OT staff,  the patient was felt to benefit from further rehabilitative care for restoration to level of function. This was felt to best be accomplished at home with home PT.   Discharge and Orthopedic Care instructions were delineated in the Discharge Plan and reviewed with the patient. All medications were delineated in the medication reconciliation tool and key points were reviewed with the patient. They were deemed stable from an Orthopedic & medical standpoint for discharge on 5/15/2024

## 2024-05-14 NOTE — CHART NOTE - NSCHARTNOTEFT_GEN_A_CORE
Patient seen and evaluated in her room on [address]. Resting without complaints at this time. Pt states pain is well tolerated. No Chest Pain, SOB, N/V/D/HA.    T(C): 36.6 (05-14-24 @ 17:45), Max: 36.6 (05-14-24 @ 17:45)  HR: 72 (05-14-24 @ 18:10) (72 - 82)  BP: 125/72 (05-14-24 @ 18:10) (108/68 - 142/62)  RR: 18 (05-14-24 @ 18:10) (13 - 18)  SpO2: 95% (05-14-24 @ 18:10) (91% - 99%)  Wt(kg): --    Exam:  Alert and Oriented, No Acute Distress.   Card: +S1/S2, RRR  Pulm: CTAB  Laterality: R lower extremity   Abduction pillow in place   Aquacel dressing on R Hip clean, dry and intact.   Sensory: Sensation intact to light touch   Motor: 5/5 (+) PF/DF/EHL/FHL  Calves soft, non-tender   2+ DP/PT pulse  Lower extremities warm and well-perfused, cap refill < 2 sec.     Xray:   IMPRESSION: s/p R Total Hip Arthroplasty (posterior approach)  Hardware aligned and symmetrical within surrounding cortex. No signs of periprosthetic changes or fractures. Will review official read when available.    Labs:     A/P: 74yFemale S/p R Total Hip Arthroplasty (posterior approach). VSS. NAD.  -PT/OT- WBAT RLE/OOB With Posterior Hip Precautions  -IS bedside   -DVT PPx: Aspirin 81 mg BID, SCD, Early OOB and Amb  -GI PPx: Protonix 40 mg QD  -Pain Control  -Continue Current Tx  -f/u AM labs.   -appreciate medical comanagement   -Dispo planning: anticipate Home with Home PT, pending further PT/OT eval.     Stiven Santos PA-C  Orthopedic Surgery Team  Team Pager #[phone]

## 2024-05-14 NOTE — ASU PREOP CHECKLIST - WEIGHT IN LBS
----- Message from Akanksha Underwood sent at 1/11/2023 12:26 PM EST -----  Rhoda Esquivel called and said that Solitario Barnett was still waiting on approval of her Magoxide.  Please advise  thanks Guillermo Velez her number is [phone] if needed 171

## 2024-05-14 NOTE — DISCHARGE NOTE PROVIDER - NSDCFUADDINST_GEN_ALL_CORE_FT
Follow up with Orthopedic Surgeon Dr. Hayward in 10-14 Days. Call Office For Appointment.  Discharge Instructions:    1. Pain Control.  2. Weight Bearing Right Lower Extremity, with assistive device/rolling walker. Posterior hip precautions.   3. Elevate and ICE the extremity as much as possible  4. Keep bandage Clean and dry. Do not let it be soaked under running water.   5. Continue DVT/PE Prophylaxis as recommended by the Anticoagulation Team. See Med Rec.   6. Out of Bed Daily, try to keep moving.

## 2024-05-14 NOTE — ASU PATIENT PROFILE, ADULT - FALL HARM RISK - HARM RISK INTERVENTIONS

## 2024-05-15 ENCOUNTER — TRANSCRIPTION ENCOUNTER (OUTPATIENT)
Age: 74
End: 2024-05-15

## 2024-05-15 VITALS — OXYGEN SATURATION: 97 % | SYSTOLIC BLOOD PRESSURE: 122 MMHG | HEART RATE: 85 BPM | DIASTOLIC BLOOD PRESSURE: 73 MMHG

## 2024-05-15 LAB
ANION GAP SERPL CALC-SCNC: 14 MMOL/L — SIGNIFICANT CHANGE UP (ref 5–17)
BUN SERPL-MCNC: 17 MG/DL — SIGNIFICANT CHANGE UP (ref 7–23)
CALCIUM SERPL-MCNC: 9.2 MG/DL — SIGNIFICANT CHANGE UP (ref 8.4–10.5)
CHLORIDE SERPL-SCNC: 104 MMOL/L — SIGNIFICANT CHANGE UP (ref 96–108)
CO2 SERPL-SCNC: 21 MMOL/L — LOW (ref 22–31)
CREAT SERPL-MCNC: 0.83 MG/DL — SIGNIFICANT CHANGE UP (ref 0.5–1.3)
EGFR: 75 ML/MIN/1.73M2 — SIGNIFICANT CHANGE UP
GLUCOSE BLDC GLUCOMTR-MCNC: 300 MG/DL — HIGH (ref 70–99)
GLUCOSE SERPL-MCNC: 214 MG/DL — HIGH (ref 70–99)
HCT VFR BLD CALC: 32 % — LOW (ref 34.5–45)
HGB BLD-MCNC: 10.9 G/DL — LOW (ref 11.5–15.5)
MCHC RBC-ENTMCNC: 30.4 PG — SIGNIFICANT CHANGE UP (ref 27–34)
MCHC RBC-ENTMCNC: 34.1 GM/DL — SIGNIFICANT CHANGE UP (ref 32–36)
MCV RBC AUTO: 89.1 FL — SIGNIFICANT CHANGE UP (ref 80–100)
NRBC # BLD: 0 /100 WBCS — SIGNIFICANT CHANGE UP (ref 0–0)
PLATELET # BLD AUTO: 233 K/UL — SIGNIFICANT CHANGE UP (ref 150–400)
POTASSIUM SERPL-MCNC: 5.1 MMOL/L — SIGNIFICANT CHANGE UP (ref 3.5–5.3)
POTASSIUM SERPL-SCNC: 5.1 MMOL/L — SIGNIFICANT CHANGE UP (ref 3.5–5.3)
RBC # BLD: 3.59 M/UL — LOW (ref 3.8–5.2)
RBC # FLD: 11.8 % — SIGNIFICANT CHANGE UP (ref 10.3–14.5)
SODIUM SERPL-SCNC: 139 MMOL/L — SIGNIFICANT CHANGE UP (ref 135–145)
WBC # BLD: 15.15 K/UL — HIGH (ref 3.8–10.5)
WBC # FLD AUTO: 15.15 K/UL — HIGH (ref 3.8–10.5)

## 2024-05-15 PROCEDURE — 97161 PT EVAL LOW COMPLEX 20 MIN: CPT

## 2024-05-15 PROCEDURE — C1713: CPT

## 2024-05-15 PROCEDURE — 27130 TOTAL HIP ARTHROPLASTY: CPT | Mod: RT

## 2024-05-15 PROCEDURE — 80048 BASIC METABOLIC PNL TOTAL CA: CPT

## 2024-05-15 PROCEDURE — 85027 COMPLETE CBC AUTOMATED: CPT

## 2024-05-15 PROCEDURE — 82962 GLUCOSE BLOOD TEST: CPT

## 2024-05-15 PROCEDURE — C1776: CPT

## 2024-05-15 PROCEDURE — 97116 GAIT TRAINING THERAPY: CPT

## 2024-05-15 PROCEDURE — 97530 THERAPEUTIC ACTIVITIES: CPT

## 2024-05-15 PROCEDURE — 97165 OT EVAL LOW COMPLEX 30 MIN: CPT

## 2024-05-15 RX ORDER — OXYCODONE HYDROCHLORIDE 5 MG/1
1 TABLET ORAL
Qty: 42 | Refills: 0
Start: 2024-05-15 | End: 2024-05-21

## 2024-05-15 RX ORDER — ASPIRIN/CALCIUM CARB/MAGNESIUM 324 MG
1 TABLET ORAL
Qty: 60 | Refills: 0
Start: 2024-05-15 | End: 2024-06-13

## 2024-05-15 RX ORDER — PANTOPRAZOLE SODIUM 20 MG/1
1 TABLET, DELAYED RELEASE ORAL
Qty: 30 | Refills: 0
Start: 2024-05-15 | End: 2024-06-13

## 2024-05-15 RX ORDER — DICLOFENAC SODIUM 75 MG/1
1 TABLET, DELAYED RELEASE ORAL
Qty: 0 | Refills: 0 | DISCHARGE

## 2024-05-15 RX ORDER — ACETAMINOPHEN AND IBUPROFEN 250; 125 MG/1; MG/1
2 TABLET ORAL
Qty: 0 | Refills: 0 | DISCHARGE

## 2024-05-15 RX ADMIN — Medication 1 DROP(S): at 05:37

## 2024-05-15 RX ADMIN — LIOTHYRONINE SODIUM 5 MICROGRAM(S): 25 TABLET ORAL at 05:37

## 2024-05-15 RX ADMIN — Medication 50 MILLIGRAM(S): at 08:16

## 2024-05-15 RX ADMIN — Medication 100 MICROGRAM(S): at 05:37

## 2024-05-15 RX ADMIN — Medication 400 MILLIGRAM(S): at 04:48

## 2024-05-15 RX ADMIN — AMLODIPINE BESYLATE 10 MILLIGRAM(S): 2.5 TABLET ORAL at 08:16

## 2024-05-15 RX ADMIN — Medication 100 MILLIGRAM(S): at 03:15

## 2024-05-15 RX ADMIN — SODIUM CHLORIDE 500 MILLILITER(S): 9 INJECTION INTRAMUSCULAR; INTRAVENOUS; SUBCUTANEOUS at 05:39

## 2024-05-15 RX ADMIN — Medication 81 MILLIGRAM(S): at 08:17

## 2024-05-15 RX ADMIN — Medication 3: at 08:18

## 2024-05-15 RX ADMIN — PANTOPRAZOLE SODIUM 40 MILLIGRAM(S): 20 TABLET, DELAYED RELEASE ORAL at 08:16

## 2024-05-15 RX ADMIN — Medication 1000 MILLIGRAM(S): at 05:48

## 2024-05-15 NOTE — PROVIDER CONTACT NOTE (MEDICATION) - ACTION/TREATMENT ORDERED:
Provider made aware. OK with rescheduling medications for breakfast. WIll endorse to incoming AM nurse.

## 2024-05-15 NOTE — OCCUPATIONAL THERAPY INITIAL EVALUATION ADULT - DIAGNOSIS, OT EVAL
Patient with deficits in ADL status and functional mobility due to impaired endurance, balance, strength, ROM

## 2024-05-15 NOTE — PROGRESS NOTE ADULT - ASSESSMENT
A/P:  74F POD#1 s/p R JOSE, recovering well.    - Pain control  - WBAT RLE  - Posterior hip precautions  - DVT ppx: ASA81 BID  - PT/OT  - OOB  - Diet: Reg  - Monitor I&Os  - Dispo: Home      For all questions related to patient care, please reach out via the on-call pager.*     Ping Oates, PGY2  Orthopedic Surgery  [facility]: [phone]  [facility]: [id number]  [facility]: [id number]

## 2024-05-15 NOTE — DISCHARGE NOTE NURSING/CASE MANAGEMENT/SOCIAL WORK - NSDCPEFALRISK_GEN_ALL_CORE
For information on Fall & Injury Prevention, visit: [email or web address] OR  [email or web address] OR  https://www.cdc.gov/steadi/patient.html

## 2024-05-15 NOTE — OCCUPATIONAL THERAPY INITIAL EVALUATION ADULT - PERTINENT HX OF CURRENT PROBLEM, REHAB EVAL
74 yr old female with PMH of HTN, DM type 2, Hashimoto's thyroiditis, seasonal allergies, anxiety. Pt reports right hip pain ~2 yr, pain with sitting long period of time, and standing. Pain scale 9/10, with OTC use 6/10. Pt evaluated by Dr. Hayward for a scheduled right total hip arthroplasty, posterior approach on 5/14/2024. Pt denies fevers, chills, h/a, c/p or SOB at this time.

## 2024-05-15 NOTE — PROGRESS NOTE ADULT - SUBJECTIVE AND OBJECTIVE BOX
ORTHO ATTENDING POST OP    s/p R JOSE  WBAT R  LE  Posterior hip precautions  ASA 81  venodynes  ancef x 24h  OOB to chair in AM  rehab consult  CBC in RR and AM   PT in PACU  If pts pain is controlled, clears PT and H/H stable, she can be DC home today
Patient is a 74y old  Female who presents with a chief complaint of R JOSE  (14 May 2024 20:02)      INTERVAL HPI/OVERNIGHT EVENTS: POD Right Hip replacement     MEDICATIONS  (STANDING):  acetaminophen   IVPB .. 1000 milliGRAM(s) IV Intermittent once  amLODIPine   Tablet 10 milliGRAM(s) Oral daily  aspirin enteric coated 81 milliGRAM(s) Oral two times a day  atorvastatin 80 milliGRAM(s) Oral at bedtime  dextrose 10% Bolus 125 milliLiter(s) IV Bolus once  dextrose 5%. 1000 milliLiter(s) (100 mL/Hr) IV Continuous <Continuous>  dextrose 5%. 1000 milliLiter(s) (50 mL/Hr) IV Continuous <Continuous>  dextrose 50% Injectable 12.5 Gram(s) IV Push once  dextrose 50% Injectable 25 Gram(s) IV Push once  glucagon  Injectable 1 milliGRAM(s) IntraMuscular once  insulin lispro (ADMELOG) corrective regimen sliding scale   SubCutaneous three times a day before meals  insulin lispro (ADMELOG) corrective regimen sliding scale   SubCutaneous at bedtime  latanoprost 0.005% Ophthalmic Solution 1 Drop(s) Both EYES at bedtime  levothyroxine 100 MICROGram(s) Oral daily  liothyronine 5 MICROGram(s) Oral daily  metoprolol succinate ER 50 milliGRAM(s) Oral daily  montelukast 10 milliGRAM(s) Oral daily  pantoprazole    Tablet 40 milliGRAM(s) Oral before breakfast  polyethylene glycol 3350 17 Gram(s) Oral at bedtime  senna 2 Tablet(s) Oral at bedtime  timolol 0.5% Solution 1 Drop(s) Both EYES at bedtime    MEDICATIONS  (PRN):  albuterol    90 MICROgram(s) HFA Inhaler 2 Puff(s) Inhalation every 4 hours PRN for bronchospasm  aluminum hydroxide/magnesium hydroxide/simethicone Suspension 30 milliLiter(s) Oral four times a day PRN Indigestion  dextrose Oral Gel 15 Gram(s) Oral once PRN Blood Glucose LESS THAN 70 milliGRAM(s)/deciliter  magnesium hydroxide Suspension 30 milliLiter(s) Oral daily PRN Constipation  ondansetron Injectable 4 milliGRAM(s) IV Push every 6 hours PRN Nausea and/or Vomiting  oxyCODONE    IR 10 milliGRAM(s) Oral every 4 hours PRN Severe Pain (7 - 10)  oxyCODONE    IR 5 milliGRAM(s) Oral every 4 hours PRN Moderate Pain (4 - 6)  traMADol 50 milliGRAM(s) Oral every 6 hours PRN Mild Pain (1 - 3)        Allergies    sulfa drugs (Rash; Urticaria; Hives)  Betadine (Rash; Urticaria)    Intolerances        REVIEW OF SYSTEMS:  CARDIOVASCULAR: No chest pain, palpitations, dizziness, or leg swelling; no shortness of breath     RESPIRATORY: No cough, wheezing, chills or hemoptysis; No shortness of breath    GASTROINTESTINAL: No abdominal or epigastric pain. No nausea, vomiting, or hematemesis; No diarrhea or constipation. No melena or hematochezia.    NEUROLOGICAL: No headaches, memory loss, loss of strength, numbness      PHYSICAL EXAM:  Vital Signs Last 24 Hrs  T(C): 36.6 (15 May 2024 08:04), Max: 36.9 (14 May 2024 19:45)  T(F): 97.9 (15 May 2024 08:04), Max: 98.4 (14 May 2024 19:45)  HR: 87 (15 May 2024 08:04) (72 - 89)  BP: 136/82 (15 May 2024 08:04) (98/59 - 142/62)  BP(mean): 92 (14 May 2024 15:52) (84 - 95)  RR: 18 (15 May 2024 08:04) (13 - 18)  SpO2: 97% (15 May 2024 08:04) (91% - 99%)    Parameters below as of 15 May 2024 08:04  Patient On (Oxygen Delivery Method): room air        GENERAL: NAD, well-groomed, well-developed  HEAD:  Atraumatic, Normocephalic  EYES: EOMI, PERRLA, conjunctiva and sclera clear  NECK: Supple, No JVD, Normal thyroid  NERVOUS SYSTEM:  Alert & Oriented X3, Good concentration;  and symmetric  CHEST/LUNG: Clear to auscultation bilaterally; No rales, rhonchi, wheezing, or rubs  HEART: S1S2 regular, without murmur, rub nor gallop  ABDOMEN: Soft, Nontender, Nondistended; Bowel sounds present  EXTREMITIES:  2+ Peripheral Pulses, No clubbing, cyanosis, or edema      LABS:                        10.9   15.15 )-----------( 233      ( 15 May 2024 07:10 )             32.0     15 May 2024 07:08    139    |  104    |  17     ----------------------------<  214    5.1     |  21     |  0.83     Ca    9.2        15 May 2024 07:08        CAPILLARY BLOOD GLUCOSE      POCT Blood Glucose.: 300 mg/dL (15 May 2024 08:04)  POCT Blood Glucose.: 286 mg/dL (14 May 2024 22:07)  POCT Blood Glucose.: 169 mg/dL (14 May 2024 17:19)  POCT Blood Glucose.: 165 mg/dL (14 May 2024 15:14)            assessment:  POD 1    Plan:  POD 1   for d/c   will adjust diabetic regimen as outpatient      
Orthopedic Surgery Progress Note     S: Patient seen and examined today. No acute events overnight. Pain is well controlled. Denies f/c, chest pain, shortness of breath, dizziness. Walked with PT and cleared     MEDICATIONS  (STANDING):  acetaminophen   IVPB .. 1000 milliGRAM(s) IV Intermittent once  amLODIPine   Tablet 10 milliGRAM(s) Oral daily  aspirin enteric coated 81 milliGRAM(s) Oral two times a day  atorvastatin 80 milliGRAM(s) Oral at bedtime  dextrose 10% Bolus 125 milliLiter(s) IV Bolus once  dextrose 5%. 1000 milliLiter(s) (50 mL/Hr) IV Continuous <Continuous>  dextrose 5%. 1000 milliLiter(s) (100 mL/Hr) IV Continuous <Continuous>  dextrose 50% Injectable 12.5 Gram(s) IV Push once  dextrose 50% Injectable 25 Gram(s) IV Push once  glucagon  Injectable 1 milliGRAM(s) IntraMuscular once  insulin lispro (ADMELOG) corrective regimen sliding scale   SubCutaneous three times a day before meals  insulin lispro (ADMELOG) corrective regimen sliding scale   SubCutaneous at bedtime  latanoprost 0.005% Ophthalmic Solution 1 Drop(s) Both EYES at bedtime  levothyroxine 100 MICROGram(s) Oral daily  liothyronine 5 MICROGram(s) Oral daily  losartan 100 milliGRAM(s) Oral daily  metoprolol succinate ER 50 milliGRAM(s) Oral daily  montelukast 10 milliGRAM(s) Oral daily  pantoprazole    Tablet 40 milliGRAM(s) Oral before breakfast  polyethylene glycol 3350 17 Gram(s) Oral at bedtime  senna 2 Tablet(s) Oral at bedtime  timolol 0.5% Solution 1 Drop(s) Both EYES at bedtime    MEDICATIONS  (PRN):  albuterol    90 MICROgram(s) HFA Inhaler 2 Puff(s) Inhalation every 4 hours PRN for bronchospasm  aluminum hydroxide/magnesium hydroxide/simethicone Suspension 30 milliLiter(s) Oral four times a day PRN Indigestion  dextrose Oral Gel 15 Gram(s) Oral once PRN Blood Glucose LESS THAN 70 milliGRAM(s)/deciliter  magnesium hydroxide Suspension 30 milliLiter(s) Oral daily PRN Constipation  ondansetron Injectable 4 milliGRAM(s) IV Push every 6 hours PRN Nausea and/or Vomiting  oxyCODONE    IR 5 milliGRAM(s) Oral every 4 hours PRN Moderate Pain (4 - 6)  oxyCODONE    IR 10 milliGRAM(s) Oral every 4 hours PRN Severe Pain (7 - 10)  traMADol 50 milliGRAM(s) Oral every 6 hours PRN Mild Pain (1 - 3)      Vital Signs Last 24 Hrs  T(C): 36.6 (15 May 2024 04:13), Max: 36.9 (14 May 2024 19:45)  T(F): 97.8 (15 May 2024 04:13), Max: 98.4 (14 May 2024 19:45)  HR: 79 (15 May 2024 04:13) (72 - 89)  BP: 109/73 (15 May 2024 04:13) (98/59 - 142/62)  BP(mean): 92 (14 May 2024 15:52) (84 - 95)  RR: 18 (15 May 2024 04:13) (13 - 18)  SpO2: 95% (15 May 2024 04:13) (91% - 99%)    Parameters below as of 15 May 2024 04:13  Patient On (Oxygen Delivery Method): room air        05-14-24 @ 07:01  -  05-15-24 @ 06:13  --------------------------------------------------------  IN: 1200 mL / OUT: 100 mL / NET: 1100 mL        Physical Exam:  Gen: NAD  R Lower Extremity:  Aquacel dressing c/d/i  Pink wedge pillow applied  SILT S/S/DP/SP/T  +EHL/FHL/TA/GSC  Compartments soft/non-tender  Toes warm, Cap refill brisk/warm and perfused, +DP/PT pulse         LABS:

## 2024-05-15 NOTE — OCCUPATIONAL THERAPY INITIAL EVALUATION ADULT - LIVES WITH, PROFILE
pvt home, 4 steps to enter, 1 flight to bed and bath. (I) ADLs and functional transfers without AD/DME. +Stall with grab bars and built-in seat/spouse

## 2024-05-15 NOTE — DISCHARGE NOTE NURSING/CASE MANAGEMENT/SOCIAL WORK - PATIENT PORTAL LINK FT
You can access the FollowMyHealth Patient Portal offered by [facility] by registering at the following website: [email or web address]. By joining [employer]’s FollowMyHealth portal, you will also be able to view your health information using other applications (apps) compatible with our system.

## 2024-05-15 NOTE — PROVIDER CONTACT NOTE (MEDICATION) - SITUATION
Patient prefers to take some medicine (losartan, metoprolol, pantoprazole, amlodipine, aspirin) at a later time just right before breakfast.

## 2024-05-15 NOTE — OCCUPATIONAL THERAPY INITIAL EVALUATION ADULT - TRANSFER TRAINING, PT EVAL
Patient will transfer to toilet with DME as needed with minimal assistance in 2 weeks. Patient will safely transfer in/out of stall shower (I) with DME as needed in 2 weeks

## 2024-05-16 ENCOUNTER — TRANSCRIPTION ENCOUNTER (OUTPATIENT)
Age: 74
End: 2024-05-16

## 2024-05-17 ENCOUNTER — TRANSCRIPTION ENCOUNTER (OUTPATIENT)
Age: 74
End: 2024-05-17

## 2024-05-17 ENCOUNTER — NON-APPOINTMENT (OUTPATIENT)
Age: 74
End: 2024-05-17

## 2024-05-18 ENCOUNTER — EMERGENCY (EMERGENCY)
Facility: HOSPITAL | Age: 74
LOS: 1 days | Discharge: HOME | End: 2024-05-18
Attending: EMERGENCY MEDICINE | Admitting: EMERGENCY MEDICINE
Payer: MEDICARE

## 2024-05-18 VITALS
HEART RATE: 87 BPM | TEMPERATURE: 98 F | RESPIRATION RATE: 16 BRPM | HEIGHT: 62 IN | SYSTOLIC BLOOD PRESSURE: 120 MMHG | OXYGEN SATURATION: 98 % | DIASTOLIC BLOOD PRESSURE: 76 MMHG

## 2024-05-18 DIAGNOSIS — Z98.890 OTHER SPECIFIED POSTPROCEDURAL STATES: Chronic | ICD-10-CM

## 2024-05-18 PROCEDURE — 99285 EMERGENCY DEPT VISIT HI MDM: CPT

## 2024-05-18 PROCEDURE — 73562 X-RAY EXAM OF KNEE 3: CPT | Mod: 26,RT

## 2024-05-18 PROCEDURE — 73552 X-RAY EXAM OF FEMUR 2/>: CPT | Mod: 26,RT

## 2024-05-18 PROCEDURE — 73502 X-RAY EXAM HIP UNI 2-3 VIEWS: CPT | Mod: 26,RT

## 2024-05-18 PROCEDURE — 72170 X-RAY EXAM OF PELVIS: CPT | Mod: 26,XE

## 2024-05-18 RX ORDER — ACETAMINOPHEN 500 MG
650 TABLET ORAL ONCE
Refills: 0 | Status: COMPLETED | OUTPATIENT
Start: 2024-05-18 | End: 2024-05-18

## 2024-05-18 RX ORDER — IBUPROFEN 200 MG
400 TABLET ORAL ONCE
Refills: 0 | Status: COMPLETED | OUTPATIENT
Start: 2024-05-18 | End: 2024-05-18

## 2024-05-18 NOTE — ED CLERICAL - NS ED CLERK NOTE PRE-ARRIVAL INFORMATION; ADDITIONAL PRE-ARRIVAL INFORMATION
This patient is enrolled in the comprehensive joint replacement (CJR) program and has active care navigation. Please call the hospitalist for consultation ([phone]) prior to disposition if you are considering admission or observation.  The hospitalist is part of the care team and can assist in acute medical management. Please call the orthopedic resident ([phone]) for any acute orthopedic issue and for all patients who are admitted or placed in observation.  If you would like to obtain additional clinical information about this patient or arrange close outpatient follow-up, please contact Marlena Medrano ([phone]), the outpatient NP care navigator.

## 2024-05-18 NOTE — ED PROVIDER NOTE - OBJECTIVE STATEMENT
74-year-old female with PMH right hip replacement 1 week ago at [facility] with orthopedic  surgeon Dr. Deon Hayward, hypertension, diabetes type 2 presents with complaint of right lower extremity swelling.  Patient states that she had a mechanical fall and landed on her butt this past Thursday when she was in the bathroom getting off the toilet with no LOC or head strike.  Patient has been using a walker and been walking around with assistance of her family members.  Patient was evaluated by physical therapist the next day on Friday and everything reported to be normal.  Patient states that in the past 24 hours her right lower extremity is more swollen than it had been before.  Patient denies chest pain, shortness of breath, fevers, chills.  Patient talk to her primary care doctor Dr. Elias Giron who wanted her to come to the emergency department to be evaluated for a blood clot.

## 2024-05-18 NOTE — ED PROVIDER NOTE - NSFOLLOWUPINSTRUCTIONS_ED_ALL_ED_FT
You were evaluated in the emergency department for right leg swelling after your surgery.  Your results were discussed with you and are attached.  You were seen by our orthopedic surgery team.  Please follow-up with your primary care doctor and your orthopedic surgeon as discussed.    Rest, drink plenty of fluids.  Advance activity as tolerated.  Continue all previously prescribed medications as directed.  Follow up with your primary care physician in 48-72 hours- bring copies of your results.  Return to the ER for worsening or persistent symptoms, and/or ANY NEW OR CONCERNING SYMPTOMS. If you have issues obtaining follow up, please call: [phone] to obtain a doctor or specialist who takes your insurance in your area. You were evaluated in the emergency department for right leg swelling after your surgery.  Your results were discussed with you and are attached. There is a new fracture near your prosthesis/  You were seen by our orthopedic surgery team.  Please follow-up with your primary care doctor and your orthopedic surgeon as discussed.     Dr. Hayward will see you in the office tomorrow. Please call his office to arrange this.     You can only bear weight partially, less than 50% on your affected leg.    Rest, drink plenty of fluids.  Advance activity as tolerated.  Continue all previously prescribed medications as directed.  Follow up with your primary care physician in 48-72 hours- bring copies of your results.  Return to the ER for worsening or persistent symptoms, and/or ANY NEW OR CONCERNING SYMPTOMS. If you have issues obtaining follow up, please call: [phone] to obtain a doctor or specialist who takes your insurance in your area.

## 2024-05-18 NOTE — ED PROVIDER NOTE - ATTENDING CONTRIBUTION TO CARE
Attending note:   After face to face evaluation of this patient, I concur with above noted hx, pe, and care plan for this patient.  Hopper: 74-year-old female with history of type 2 diabetes and hypertension.  Patient is also 4 days post right hip replacement for arthritis.  Patient presents ED with right leg swelling and pain.  Patient had a fall 3 days ago, slip and fall, in the bathroom.  Patient landed on right side.  Patient did not hit her head at that time.  Since then pain is worsened and swelling is worsened but right leg swelling became severe today.  Patient denies any chest pain or shortness of breath.  Patient denies any palpitations or fevers and chills.  Patient has been taking baby aspirin.  History of the on exam patient's vital signs are normal.  Patient is in pain but no respiratory distress is noted.  Lungs are clear and heart is regular rate and rhythm.  Abdomen is soft and nontender.  Right hip surgical site shows some localized tender and ecchymosis.  Patient is able to range right hip to approximately 30 degrees and right knee to about 60 degrees.  There is significant edema throughout the lower extremity with pitting edema.  Pulses are present.  There is no numbness noted.  After discussion with orthopedics they had requested x-rays and CT bony pelvis but DVT study also ordered.  Labs also ordered for looking for drop in hemoglobin or infection.

## 2024-05-18 NOTE — ED PROVIDER NOTE - CLINICAL SUMMARY MEDICAL DECISION MAKING FREE TEXT BOX
74-year-old female with PMH right hip replacement 1 week ago at [facility] with orthopedic  surgeon Dr. Deon Hayward, hypertension, diabetes type 2 presents with complaint of right lower extremity swelling.  Patient states that she had a mechanical fall and landed on her butt this past Thursday when she was in the bathroom getting off the toilet with no LOC or head strike.  Patient has been using a walker and been walking around with assistance of her family members.  Patient was evaluated by physical therapist the next day on Friday and everything reported to be normal.  Patient states that in the past 24 hours her right lower extremity is more swollen than it had been before.  Patient denies chest pain, shortness of breath, fevers, chills.  Patient talk to her primary care doctor Dr. Elias Giron who wanted her to come to the emergency department to be evaluated for a blood clot.  Exam shows 2+ nonpitting edema to RLE, limited hip ROM 2/2 pain, 1+ DP pulses bilaterally. Will obtain US duplex to assess for DVT, Xrays RLE, CT bony pelvis, possible ortho consult if abnormal. Dispo pending results likely home.

## 2024-05-18 NOTE — ED PROVIDER NOTE - PHYSICAL EXAMINATION
Gen: well appearing, NAD  HEENT: no conjunctivitis  Cardiac: regular rate rhythm, normal S1S2  Chest: CTA BL, no wheeze or crackles  Abdomen: normal BS, soft, NT  Extremity: RLE 2+ nonpitting swelling appreciated, pain on hip flexion and R hip mildly tender to palpation, 1+ DP pulses bilaterally  Skin: no rash  Neuro: grossly normal

## 2024-05-18 NOTE — ED PROVIDER NOTE - IV ALTEPLASE DOOR HIDDEN
There are no preventive care reminders to display for this patient.    Patient is up to date, no discussion needed.   show

## 2024-05-18 NOTE — ED PROVIDER NOTE - CARE PROVIDER_API CALL
Deon Hayward  Orthopaedic Surgery  [address]  [place] [address]  Phone: [phone]  Fax: [phone]  Follow Up Time: 1-3 Days

## 2024-05-18 NOTE — ED PROVIDER NOTE - WET READ LAUNCH FT
cShuyler Tyler is a [de-identified] y.o. male evaluated via audio only technology on 5/28/2020. Consent: He and/or his health care decision maker is aware that he may receive a bill for this audio only encounter, depending on his insurance coverage, and has provided verbal consent to proceed: Yes    I communicated with the patient and/or health care decision maker about the nature and details of the following:  Assessment & Plan:   Diagnoses and all orders for this visit:    1. Medicare annual wellness visit, subsequent    2. Advance directive discussed with patient    3. Benign hypertension with chronic kidney disease, stage III (HCC)  Hypertension is not controlled but creat is stable. To increase carvedilol to 25 mg BID as ordered by cardiologist and call us with blood pressure readings in 2 weeks. 4. Hyperlipidemia, mixed  Hyperlipidemia is controlled  -     LIPID PANEL; Future  -     METABOLIC PANEL, COMPREHENSIVE; Future    5. Glucose intolerance (impaired glucose tolerance)  A1c improved over time. Due to be rechecked. -     HEMOGLOBIN A1C WITH EAG; Future    6. Coronary artery disease involving native coronary artery of native heart without angina pectoris  Stable taking antiplatelet agent and statin. 7. Gastroesophageal reflux disease without esophagitis  Symptoms are adequately controlled. Follow up for HTN, CKD, Chol, CAD, gluc in 6 months. 12  Subjective:   Schuyler Tyler is a [de-identified] y.o. male who was seen for No chief complaint on file. He presents for follow up of hypertension with CKD, hyperlipidemia, coronary artery disease with hx of MI and CABG, obesity and glucose intolerance. Cardiologist increased carvediool to 25 mg but he has not started it yet.      Diet and Lifestyle: generally follows a low fat low cholesterol diet, generally follows a low sodium diet, exercises regularly, nonsmoker  Medication compliance: compliant all of the time  Medication side effects: none  Home BP Monitorin's/70's  Cardiovascular ROS:  He complains of lower extremity edema, dizziness. Vertigo   He denies palpitations, exertional chest pressure/discomfort, claudication, dyspnea on exertion     He has pain in neck for some time that is unchanged. Tylenol taken BID helps pain.      Patient Active Problem List   Diagnosis Code    Hyperlipidemia, mixed E78.2    Impotence N52.9    Diverticulosis K57.90    Hemorrhoids K64.9    Vitamin D deficiency E55.9    Benign hypertension with chronic kidney disease, stage III ([facility]) I12.9, N18.3    GERD (gastroesophageal reflux disease) K21.9    Intermittent angle-closure glaucoma H40.239    Coronary artery disease involving native coronary artery of native heart without angina pectoris I25.10    Nephrolithiasis N20.0    Left median nerve neuropathy G56.12    CKD (chronic kidney disease) stage 3, GFR 30-59 ml/min ([facility]) N18.3    Lumbar stenosis with neurogenic claudication M48.062    Unequal blood pressure in upper extremities R09.89    Glucose intolerance (impaired glucose tolerance) R73.02    Combined forms of age-related cataract of right eye H25.811    Combined forms of age-related cataract of left eye H25.812    Spinal stenosis M48.00     Past Medical History:   Diagnosis Date    A-fib ([facility] [address]     Arthritis     CAD (coronary artery disease) 2013    Cataract     Diverticulosis 2009    Dyslipidemia 2009    Hemorrhoids 2009    HTN 2009    Hypercholesterolemia     Impotence 2009    MI (myocardial infarction) ([address] 2013    CABG X 3     Nausea & vomiting     Neuropathy 2009    Open angle glaucoma with borderline intraocular pressure     Subarachnoid hemorrhage ([address] 3/2/2020    Vitamin D deficiency 2009     Past Surgical History:   Procedure Laterality Date    CARDIAC SURG PROCEDURE UNLIST      CABG X 3    ENDOSCOPY, COLON, DIAGNOSTIC  805287    HX CATARACT REMOVAL Bilateral 2017    HX CORONARY ARTERY BYPASS GRAFT  12/2013    triple    HX HERNIA REPAIR Bilateral     HX LITHOTRIPSY  [id number]    HX ORTHOPAEDIC      back surgery     Social History     Socioeconomic History    Marital status:      Spouse name: Not on file    Number of children: Not on file    Years of education: Not on file    Highest education level: Not on file   Tobacco Use    Smoking status: Never Smoker    Smokeless tobacco: Never Used   Substance and Sexual Activity    Alcohol use: No    Drug use: No    Sexual activity: Yes     Partners: Female     Family History   Problem Relation Age of Onset    Hypertension Mother     Diabetes Mother     Cancer Father         LIVER    Alcohol abuse Brother     Diabetes Sister     Hypertension Sister     Arthritis-osteo Sister     Kidney Disease Sister         DIALYSIS    Alcohol abuse Brother     Suicide Brother     No Known Problems Brother     No Known Problems Brother     Dementia Brother     Hypertension Son     Hypertension Son     Anesth Problems Neg Hx      Allergies   Allergen Reactions    Percocet [Oxycodone-Acetaminophen] Other (comments)     \"feel weird\"     Current Outpatient Medications   Medication Sig Dispense Refill    atorvastatin (LIPITOR) 80 mg tablet TAKE ONE TABLET ONCE DAILY 30 Tab 11    amLODIPine (NORVASC) 10 mg tablet TAKE ONE TABLET ONCE DAILY  (HIGH BLOOD PRESSURE) 30 Tab 11    DULoxetine (CYMBALTA) 30 mg capsule TAKE ONE CAPSULE ONCE DAILY 30 Cap 11    losartan-hydroCHLOROthiazide (HYZAAR) 100-12.5 mg per tablet Take 1 Tab by mouth daily. 30 Tab 11    carvedilol (COREG) 12.5 mg tablet Take 1 Tab by mouth two (2) times daily (with meals). 60 Tab 11    acetaminophen (TYLENOL) 500 mg tablet Take 1,000 mg by mouth daily as needed for Pain.  aspirin 81 mg chewable tablet Take 1 Tab by mouth nightly. 90 Tab 3         Review of Systems   Constitutional: Negative for malaise/fatigue and weight loss.    Gastrointestinal: Negative There are no Wet Read(s) to document. for constipation and diarrhea. Musculoskeletal: Positive for back pain. Negative for joint pain. Neurological: Negative for tingling and focal weakness. Vertigo off and on     Lab Results   Component Value Date/Time    Hemoglobin A1c 5.9 (H) 08/13/2018 01:35 PM    Hemoglobin A1c (POC) 5.4 05/21/2019 10:09 AM     Lab Results   Component Value Date/Time    Cholesterol, total 155 05/21/2019 10:49 AM    HDL Cholesterol 49 05/21/2019 10:49 AM    LDL, calculated 81 05/21/2019 10:49 AM    VLDL, calculated 25 05/21/2019 10:49 AM    Triglyceride 127 05/21/2019 10:49 AM    CHOL/HDL Ratio 2.7 07/09/2010 09:43 AM     Lab Results   Component Value Date/Time    Sodium 141 05/28/2019 07:58 AM    Potassium 4.0 05/28/2019 07:58 AM    Chloride 110 (H) 05/28/2019 07:58 AM    CO2 25 05/28/2019 07:58 AM    Anion gap 6 05/28/2019 07:58 AM    Glucose 127 (H) 05/28/2019 07:58 AM    BUN 29 (H) 05/28/2019 07:58 AM    Creatinine 1.53 (H) 05/28/2019 07:58 AM    BUN/Creatinine ratio 19 05/28/2019 07:58 AM    GFR est AA 53 (L) 05/28/2019 07:58 AM    GFR est non-AA 44 (L) 05/28/2019 07:58 AM    Calcium 8.9 05/28/2019 07:58 AM    Bilirubin, total 0.8 05/28/2019 07:58 AM    Alk. phosphatase 77 05/28/2019 07:58 AM    Protein, total 7.5 05/28/2019 07:58 AM    Albumin 3.8 05/28/2019 07:58 AM    Globulin 3.7 05/28/2019 07:58 AM    A-G Ratio 1.0 (L) 05/28/2019 07:58 AM    ALT (SGPT) 45 05/28/2019 07:58 AM         I affirm this is a Patient-Initiated Episode with a Patient who has not had a related appointment within my department in the past 7 days or scheduled within the next 24 hours.     Total Time: minutes: 21-30 minutes    Note: not billable if this call serves to triage the patient into an appointment for the relevant concern      Chelo Guevara MD

## 2024-05-18 NOTE — ED PROVIDER NOTE - PATIENT PORTAL LINK FT
You can access the FollowMyHealth Patient Portal offered by [facility] by registering at the following website: [email or web address]. By joining [employer]’s FollowMyHealth portal, you will also be able to view your health information using other applications (apps) compatible with our system.

## 2024-05-18 NOTE — ED PROVIDER NOTE - CARE PLAN
1 Principal Discharge DX:	Swelling of right lower extremity   Principal Discharge DX:	Periprosthetic hip fracture

## 2024-05-18 NOTE — ED PROVIDER NOTE - PROGRESS NOTE DETAILS
Patient signed out to me, does not want to wait for the CT, wants the IV out, will call Dr. Hayward's team and discuss disposition. Kannan Pabon, ED Attending Kevan Espino MD PGY-2: pt refusing CT bony pelvis as she does not want to wait for it and is uncomfortable in the stretcher. Ortho resident at bedside Rene, PGY-3, EM: Patient cleared by Ortho for discharge, they are recommending partial weightbearing less than 50%.  Patient states she has a wheelchair or a walker at home that she could use for this.  Verbalized understanding of need for reduced weightbearing.  Daughter at the bedside does not have concerns about her going home.  Patient states the fall was mechanical in nature with her socks slipped on the ground.  Patient to follow-up with Dr. Hayward on Monday, follow-up instructions reiterated by patient. return precautions discussed.

## 2024-05-18 NOTE — ED ADULT TRIAGE NOTE - CHIEF COMPLAINT QUOTE
Pt c/o R foot swelling since earlier today. Hx of R hip replacement Tuesday at [facility]. Had mechanical fall s/p procedure and landed on buttocks, no LOC/head strike. Sensation and ROM intact b/l. Denies chest pain, SOB. Hx of HTN, DM2

## 2024-05-18 NOTE — ED ADULT TRIAGE NOTE - ARRIVAL FROM
Healthy Living Recommendations:    -Exercise 150 minutes/ week to include aerobic and weights.    -Food intake to include more plant based and whole grains. Avoid foods made with raw sugar such as candy, cookies, drinks with sugar. Avoid greasy, fried, and processed foods. Studies show that obesity, a diet high in red meat and processed foods, tobacco and alcohol abuse, and a sedentary lifestyle increase the risk of developing colorectal cancer.    -Eye exam every 2 years over age 36.     -Dental exam every 6 months.    -Colonoscopy at age 39      NEW to PROVIDER:   -Please call your pharmacy  for medication refills.  -Please be sure to call our office if your illness/problem that has been treated has not completely resolved. -Bring an accurate list of your medications with you at every appointment to ensure that we have the correct information.  -Arrive 15 minutes prior to appointments.     [address] [place] [address]   Office hours are: Monday - Friday 7 am- 5 pm. Phone lines turn on at 8 am.   Office [address]: [phone] Home

## 2024-05-19 ENCOUNTER — NON-APPOINTMENT (OUTPATIENT)
Age: 74
End: 2024-05-19

## 2024-05-19 VITALS
DIASTOLIC BLOOD PRESSURE: 67 MMHG | RESPIRATION RATE: 16 BRPM | SYSTOLIC BLOOD PRESSURE: 113 MMHG | HEART RATE: 75 BPM | TEMPERATURE: 98 F | OXYGEN SATURATION: 99 %

## 2024-05-19 PROBLEM — E11.9 TYPE 2 DIABETES MELLITUS WITHOUT COMPLICATIONS: Chronic | Status: ACTIVE | Noted: 2024-05-06

## 2024-05-19 PROBLEM — F41.9 ANXIETY DISORDER, UNSPECIFIED: Chronic | Status: ACTIVE | Noted: 2024-05-06

## 2024-05-19 PROBLEM — Z86.39 PERSONAL HISTORY OF OTHER ENDOCRINE, NUTRITIONAL AND METABOLIC DISEASE: Chronic | Status: ACTIVE | Noted: 2024-05-06

## 2024-05-19 PROBLEM — I10 ESSENTIAL (PRIMARY) HYPERTENSION: Chronic | Status: ACTIVE | Noted: 2024-05-06

## 2024-05-19 PROBLEM — J30.2 OTHER SEASONAL ALLERGIC RHINITIS: Chronic | Status: ACTIVE | Noted: 2024-05-06

## 2024-05-19 PROBLEM — Z86.69 PERSONAL HISTORY OF OTHER DISEASES OF THE NERVOUS SYSTEM AND SENSE ORGANS: Chronic | Status: ACTIVE | Noted: 2024-05-06

## 2024-05-19 LAB
ALBUMIN SERPL ELPH-MCNC: 3.4 G/DL — SIGNIFICANT CHANGE UP (ref 3.3–5)
ALP SERPL-CCNC: 52 U/L — SIGNIFICANT CHANGE UP (ref 40–120)
ALT FLD-CCNC: 20 U/L — SIGNIFICANT CHANGE UP (ref 4–33)
ANION GAP SERPL CALC-SCNC: 14 MMOL/L — SIGNIFICANT CHANGE UP (ref 7–14)
APTT BLD: 28.3 SEC — SIGNIFICANT CHANGE UP (ref 24.5–35.6)
AST SERPL-CCNC: 19 U/L — SIGNIFICANT CHANGE UP (ref 4–32)
BASOPHILS # BLD AUTO: 0.04 K/UL — SIGNIFICANT CHANGE UP (ref 0–0.2)
BASOPHILS NFR BLD AUTO: 0.4 % — SIGNIFICANT CHANGE UP (ref 0–2)
BILIRUB SERPL-MCNC: 0.5 MG/DL — SIGNIFICANT CHANGE UP (ref 0.2–1.2)
BUN SERPL-MCNC: 14 MG/DL — SIGNIFICANT CHANGE UP (ref 7–23)
CALCIUM SERPL-MCNC: 9.7 MG/DL — SIGNIFICANT CHANGE UP (ref 8.4–10.5)
CHLORIDE SERPL-SCNC: 104 MMOL/L — SIGNIFICANT CHANGE UP (ref 98–107)
CO2 SERPL-SCNC: 23 MMOL/L — SIGNIFICANT CHANGE UP (ref 22–31)
CREAT SERPL-MCNC: 0.81 MG/DL — SIGNIFICANT CHANGE UP (ref 0.5–1.3)
EGFR: 77 ML/MIN/1.73M2 — SIGNIFICANT CHANGE UP
EOSINOPHIL # BLD AUTO: 0.14 K/UL — SIGNIFICANT CHANGE UP (ref 0–0.5)
EOSINOPHIL NFR BLD AUTO: 1.3 % — SIGNIFICANT CHANGE UP (ref 0–6)
GLUCOSE SERPL-MCNC: 164 MG/DL — HIGH (ref 70–99)
HCT VFR BLD CALC: 31.3 % — LOW (ref 34.5–45)
HGB BLD-MCNC: 10.5 G/DL — LOW (ref 11.5–15.5)
IANC: 6.7 K/UL — SIGNIFICANT CHANGE UP (ref 1.8–7.4)
IMM GRANULOCYTES NFR BLD AUTO: 1.5 % — HIGH (ref 0–0.9)
INR BLD: 0.99 RATIO — SIGNIFICANT CHANGE UP (ref 0.85–1.18)
LYMPHOCYTES # BLD AUTO: 27.6 % — SIGNIFICANT CHANGE UP (ref 13–44)
LYMPHOCYTES # BLD AUTO: 3.08 K/UL — SIGNIFICANT CHANGE UP (ref 1–3.3)
MCHC RBC-ENTMCNC: 30.2 PG — SIGNIFICANT CHANGE UP (ref 27–34)
MCHC RBC-ENTMCNC: 33.5 GM/DL — SIGNIFICANT CHANGE UP (ref 32–36)
MCV RBC AUTO: 89.9 FL — SIGNIFICANT CHANGE UP (ref 80–100)
MONOCYTES # BLD AUTO: 1.02 K/UL — HIGH (ref 0–0.9)
MONOCYTES NFR BLD AUTO: 9.1 % — SIGNIFICANT CHANGE UP (ref 2–14)
NEUTROPHILS # BLD AUTO: 6.7 K/UL — SIGNIFICANT CHANGE UP (ref 1.8–7.4)
NEUTROPHILS NFR BLD AUTO: 60.1 % — SIGNIFICANT CHANGE UP (ref 43–77)
NRBC # BLD: 0 /100 WBCS — SIGNIFICANT CHANGE UP (ref 0–0)
NRBC # FLD: 0.02 K/UL — HIGH (ref 0–0)
PLATELET # BLD AUTO: 310 K/UL — SIGNIFICANT CHANGE UP (ref 150–400)
POTASSIUM SERPL-MCNC: 4.5 MMOL/L — SIGNIFICANT CHANGE UP (ref 3.5–5.3)
POTASSIUM SERPL-SCNC: 4.5 MMOL/L — SIGNIFICANT CHANGE UP (ref 3.5–5.3)
PROT SERPL-MCNC: 6.8 G/DL — SIGNIFICANT CHANGE UP (ref 6–8.3)
PROTHROM AB SERPL-ACNC: 11.2 SEC — SIGNIFICANT CHANGE UP (ref 9.5–13)
RBC # BLD: 3.48 M/UL — LOW (ref 3.8–5.2)
RBC # FLD: 12.1 % — SIGNIFICANT CHANGE UP (ref 10.3–14.5)
SODIUM SERPL-SCNC: 141 MMOL/L — SIGNIFICANT CHANGE UP (ref 135–145)
WBC # BLD: 11.15 K/UL — HIGH (ref 3.8–10.5)
WBC # FLD AUTO: 11.15 K/UL — HIGH (ref 3.8–10.5)

## 2024-05-19 PROCEDURE — 72192 CT PELVIS W/O DYE: CPT | Mod: 26,MC

## 2024-05-19 PROCEDURE — 93971 EXTREMITY STUDY: CPT | Mod: 26,LT

## 2024-05-19 PROCEDURE — 99284 EMERGENCY DEPT VISIT MOD MDM: CPT | Mod: 24

## 2024-05-19 RX ADMIN — Medication 650 MILLIGRAM(S): at 00:00

## 2024-05-19 RX ADMIN — Medication 400 MILLIGRAM(S): at 00:00

## 2024-05-19 NOTE — CONSULT NOTE ADULT - ASSESSMENT
ASSESSMENT & PLAN  74yFemale s/p R JOSE 4 days ago with Dr. Hayward.  -WBAT  - US RLE negative for any blood clots  -pain control  -ice/cold compress    For all questions related to patient care, please reach out to the on-call team via the pager.     Jennifer Guzman, PGY 2  Orthopaedic Surgery  [facility] [id number]  [facility] [phone]  [facility] [phone]   ASSESSMENT & PLAN  74yFemale s/p R JOSE 4 days ago with Dr. Hayward.  - Partial weight bearing on RLE, with walker  - US RLE negative for any blood clots  -pain control  -ice/cold compress    FU With Dr. Hayward in his clinic tomorrow    For all questions related to patient care, please reach out to the on-call team via the pager.     Jennifer Guzman, PGY 2  Orthopaedic Surgery  [facility] [phone]  [facility] [phone]  [facility] [phone]

## 2024-05-19 NOTE — ED ADULT NURSE REASSESSMENT NOTE - NS ED NURSE REASSESS COMMENT FT1
Pt lying comfortably in stretcher. Offers no complaints at this time. VSS. Respirations even and unlabored. Ortho at bedside. Pending dispo.

## 2024-05-19 NOTE — ED ADULT NURSE REASSESSMENT NOTE - NS ED NURSE REASSESS COMMENT FT1
Pt with no acute changes at this time. Pt A&ox4, ambulatory, on room air. Pt respirations even and unlabored, chest rise and fall equal b/l. Pt denies chest pain, HA, SOB, dizziness, N/V/D, fever/chills. Stretcher in lowest position, call bell within reach, pt safety maintained.

## 2024-05-19 NOTE — ED ADULT NURSE NOTE - CHIEF COMPLAINT QUOTE
Pt c/o R foot swelling since earlier today. Hx of R hip replacement Tuesday at [facility]. Had mechanical fall s/p procedure and landed on buttocks, no LOC/head strike. Sensation and ROM intact b/l. Denies chest pain, SOB. Hx of HTN, DM2

## 2024-05-19 NOTE — CONSULT NOTE ADULT - SUBJECTIVE AND OBJECTIVE BOX
HPI  74yFemale w/ PMH HTN, DM T2, Anxiety, s/p R JOSE on 5/14/24 with Dr. Hayward coming in for two days of RLE swelling. Patient reports falling on Thursday onto her buttock in the bathroom. Has been able to ambulate since the fall with no increase of pain. Denies headstrike or LOC. Denies numbness/tingling in the RLE. Denies any other trauma/injuries at this time.     ROS  Negative unless otherwise specified in HPI.    PAST MEDICAL & SURGICAL Hx  PAST MEDICAL & SURGICAL HISTORY:  HTN (hypertension)      DM (diabetes mellitus), type 2      History of glaucoma      Anxiety      Seasonal allergies      H/O Hashimoto thyroiditis      S/P arthroscopy      S/P colonoscopy          MEDICATIONS  Home Medications:  Advil Dual Action With Acetaminophen 250 mg-125 mg oral tablet: 2 tab(s) orally every 6 hours as needed for fever, H/A, mild pain (15 May 2024 09:12)  Albuterol (Eqv-Proventil HFA) 90 mcg/inh inhalation aerosol: 2 puff(s) inhaled every 4 hours as needed for  bronchospasm (14 May 2024 09:39)  Allegra 180 mg oral tablet: 1 tab(s) orally once a day as needed for  allergy symptoms (14 May 2024 09:39)  amlodipine-olmesartan 10 mg-40 mg oral tablet: 1 tab(s) orally once a day (14 May 2024 09:39)  clonazePAM 0.5 mg oral tablet: 1 tab(s) orally 2 times a day as needed for  anxiety (14 May 2024 09:39)  diclofenac sodium 75 mg oral delayed release tablet: 1 tab(s) orally 2 times a day as needed for  moderate pain (15 May 2024 09:12)  glimepiride 2 mg oral tablet: 1 tab(s) orally once a day (14 May 2024 09:39)  levothyroxine 100 mcg (0.1 mg) oral tablet: 1 tab(s) orally once a day (14 May 2024 09:39)  liothyronine 5 mcg oral tablet: 1 tab(s) orally once a day (14 May 2024 09:39)  Lumigan 0.01% ophthalmic solution: 1 drop(s) in each affected eye once a day (at bedtime) (14 May 2024 09:39)  metoprolol succinate 50 mg oral tablet, extended release: 1 tab(s) orally once a day (14 May 2024 09:39)  montelukast 10 mg oral tablet: 1 tab(s) orally once a day (14 May 2024 09:39)  rosuvastatin 20 mg oral tablet: 1 tab(s) orally once a day (14 May 2024 09:39)  Segluromet 7.5 mg-1000 mg oral tablet: 1 tab(s) orally once a day (14 May 2024 09:39)  semaglutide 0.5 mg/0.5 mL (0.5 mg dose) subcutaneous solution: 0.5 milligram(s) subcutaneously once a week on Sundays (14 May 2024 09:39)  Timoptic in Ocudose 0.5% preservative-free ophthalmic solution: 1 drop(s) in each affected eye once a day (14 May 2024 09:39)      ALLERGIES  Betadine (Rash; Urticaria)  sulfa drugs (Rash; Urticaria; Hives)      FAMILY Hx  FAMILY HISTORY:      SOCIAL Hx  Social History:      VITALS  Vital Signs Last 24 Hrs  T(C): 36.8 (18 May 2024 22:15), Max: 36.8 (18 May 2024 22:15)  T(F): 98.2 (18 May 2024 22:15), Max: 98.2 (18 May 2024 22:15)  HR: 87 (18 May 2024 22:15) (87 - 87)  BP: 120/76 (18 May 2024 22:15) (120/76 - 120/76)  BP(mean): --  RR: 16 (18 May 2024 22:15) (16 - 16)  SpO2: 98% (18 May 2024 22:15) (98% - 98%)    Parameters below as of 18 May 2024 22:15  Patient On (Oxygen Delivery Method): room air        PHYSICAL EXAM  Gen: Lying in bed, NAD  Resp: No increased WOB  RLE:  Dressing C/D/I, no gross deformity  +TTP over R hip, no TTP along remainder of extremity; compartments soft  Limited ROM at hip 2/2 pain  Motor: TA/EHL/GS/FHL intact  Sensory: DP/SP/Tib/Denise/Saph SILT  +DP pulse, WWP    Secondary survey:  No TTP along spine or other extremities, SILT and compartments soft throughout    LABS                        10.5   11.15 )-----------( 310      ( 19 May 2024 00:01 )             31.3     05-19    141  |  104  |  14  ----------------------------<  164<H>  4.5   |  23  |  0.81    Ca    9.7      19 May 2024 00:01    TPro  6.8  /  Alb  3.4  /  TBili  0.5  /  DBili  x   /  AST  19  /  ALT  20  /  AlkPhos  52  05-19    PT/INR - ( 19 May 2024 00:01 )   PT: 11.2 sec;   INR: 0.99 ratio         PTT - ( 19 May 2024 00:01 )  PTT:28.3 sec    IMAGING  XRs:      HPI  74yFemale w/ PMH HTN, DM T2, Anxiety, s/p R JOSE on 5/14/24 with Dr. Hayward coming in for two days of RLE swelling. Patient reports falling on Thursday onto her buttock in the bathroom. Has been able to ambulate since the fall with no increase of pain. Denies headstrike or LOC. Denies numbness/tingling in the RLE. Started to note swelling in her foot and ankle yesterday. Denies any other trauma/injuries at this time.     ROS  Negative unless otherwise specified in HPI.    PAST MEDICAL & SURGICAL Hx  PAST MEDICAL & SURGICAL HISTORY:  HTN (hypertension)      DM (diabetes mellitus), type 2      History of glaucoma      Anxiety      Seasonal allergies      H/O Hashimoto thyroiditis      S/P arthroscopy      S/P colonoscopy          MEDICATIONS  Home Medications:  Advil Dual Action With Acetaminophen 250 mg-125 mg oral tablet: 2 tab(s) orally every 6 hours as needed for fever, H/A, mild pain (15 May 2024 09:12)  Albuterol (Eqv-Proventil HFA) 90 mcg/inh inhalation aerosol: 2 puff(s) inhaled every 4 hours as needed for  bronchospasm (14 May 2024 09:39)  Allegra 180 mg oral tablet: 1 tab(s) orally once a day as needed for  allergy symptoms (14 May 2024 09:39)  amlodipine-olmesartan 10 mg-40 mg oral tablet: 1 tab(s) orally once a day (14 May 2024 09:39)  clonazePAM 0.5 mg oral tablet: 1 tab(s) orally 2 times a day as needed for  anxiety (14 May 2024 09:39)  diclofenac sodium 75 mg oral delayed release tablet: 1 tab(s) orally 2 times a day as needed for  moderate pain (15 May 2024 09:12)  glimepiride 2 mg oral tablet: 1 tab(s) orally once a day (14 May 2024 09:39)  levothyroxine 100 mcg (0.1 mg) oral tablet: 1 tab(s) orally once a day (14 May 2024 09:39)  liothyronine 5 mcg oral tablet: 1 tab(s) orally once a day (14 May 2024 09:39)  Lumigan 0.01% ophthalmic solution: 1 drop(s) in each affected eye once a day (at bedtime) (14 May 2024 09:39)  metoprolol succinate 50 mg oral tablet, extended release: 1 tab(s) orally once a day (14 May 2024 09:39)  montelukast 10 mg oral tablet: 1 tab(s) orally once a day (14 May 2024 09:39)  rosuvastatin 20 mg oral tablet: 1 tab(s) orally once a day (14 May 2024 09:39)  Segluromet 7.5 mg-1000 mg oral tablet: 1 tab(s) orally once a day (14 May 2024 09:39)  semaglutide 0.5 mg/0.5 mL (0.5 mg dose) subcutaneous solution: 0.5 milligram(s) subcutaneously once a week on Sundays (14 May 2024 09:39)  Timoptic in Ocudose 0.5% preservative-free ophthalmic solution: 1 drop(s) in each affected eye once a day (14 May 2024 09:39)      ALLERGIES  Betadine (Rash; Urticaria)  sulfa drugs (Rash; Urticaria; Hives)      FAMILY Hx  FAMILY HISTORY:      SOCIAL Hx  Social History:      VITALS  Vital Signs Last 24 Hrs  T(C): 36.8 (18 May 2024 22:15), Max: 36.8 (18 May 2024 22:15)  T(F): 98.2 (18 May 2024 22:15), Max: 98.2 (18 May 2024 22:15)  HR: 87 (18 May 2024 22:15) (87 - 87)  BP: 120/76 (18 May 2024 22:15) (120/76 - 120/76)  BP(mean): --  RR: 16 (18 May 2024 22:15) (16 - 16)  SpO2: 98% (18 May 2024 22:15) (98% - 98%)    Parameters below as of 18 May 2024 22:15  Patient On (Oxygen Delivery Method): room air        PHYSICAL EXAM  Gen: Lying in bed, NAD  Resp: No increased WOB  RLE:  Dressing C/D/I, no gross deformity  +TTP over R hip, no TTP along remainder of extremity; compartments soft  Limited ROM at hip 2/2 pain  Motor: TA/EHL/GS/FHL intact  Sensory: DP/SP/Tib/Denise/Saph SILT  +DP pulse, WWP    Secondary survey:  No TTP along spine or other extremities, SILT and compartments soft throughout    LABS                        10.5   11.15 )-----------( 310      ( 19 May 2024 00:01 )             31.3     05-19    141  |  104  |  14  ----------------------------<  164<H>  4.5   |  23  |  0.81    Ca    9.7      19 May 2024 00:01    TPro  6.8  /  Alb  3.4  /  TBili  0.5  /  DBili  x   /  AST  19  /  ALT  20  /  AlkPhos  52  05-19    PT/INR - ( 19 May 2024 00:01 )   PT: 11.2 sec;   INR: 0.99 ratio         PTT - ( 19 May 2024 00:01 )  PTT:28.3 sec    IMAGING  XRs:

## 2024-05-19 NOTE — ED ADULT NURSE NOTE - NSFALLHARMRISKINTERV_ED_ALL_ED

## 2024-05-19 NOTE — ED ADULT NURSE NOTE - OBJECTIVE STATEMENT
Pt received to room 25 a&ox4, on room air coming to ED c/o right foot swelling. Pt history of  right hip replacement. Pt had right hip replacement x 1 week ago, no endorsing right foot swelling. Pt respirations even and unlabored, chest rise and fall equal b/l. Pt denies chest pain, HA, SOB, dizziness, N/V/D, fever/chills. #20g placed to LAC, labs collected and sent. Pt pending US. Stretcher in lowest position, call bell within reach, pt safety maintained.

## 2024-05-20 ENCOUNTER — TRANSCRIPTION ENCOUNTER (OUTPATIENT)
Age: 74
End: 2024-05-20

## 2024-05-20 ENCOUNTER — APPOINTMENT (OUTPATIENT)
Dept: ORTHOPEDIC SURGERY | Facility: CLINIC | Age: 74
End: 2024-05-20
Payer: MEDICARE

## 2024-05-20 PROCEDURE — 73502 X-RAY EXAM HIP UNI 2-3 VIEWS: CPT

## 2024-05-20 PROCEDURE — 99024 POSTOP FOLLOW-UP VISIT: CPT

## 2024-05-21 ENCOUNTER — TRANSCRIPTION ENCOUNTER (OUTPATIENT)
Age: 74
End: 2024-05-21

## 2024-05-21 ENCOUNTER — APPOINTMENT (OUTPATIENT)
Dept: CARE COORDINATION | Facility: HOME HEALTH | Age: 74
End: 2024-05-21
Payer: MEDICARE

## 2024-05-21 VITALS
WEIGHT: 170 LBS | HEART RATE: 75 BPM | HEIGHT: 62 IN | OXYGEN SATURATION: 99 % | RESPIRATION RATE: 16 BRPM | SYSTOLIC BLOOD PRESSURE: 116 MMHG | BODY MASS INDEX: 31.28 KG/M2 | DIASTOLIC BLOOD PRESSURE: 62 MMHG | TEMPERATURE: 97.3 F

## 2024-05-21 DIAGNOSIS — I10 ESSENTIAL (PRIMARY) HYPERTENSION: ICD-10-CM

## 2024-05-21 DIAGNOSIS — Z91.81 HISTORY OF FALLING: ICD-10-CM

## 2024-05-21 DIAGNOSIS — E78.49 OTHER HYPERLIPIDEMIA: ICD-10-CM

## 2024-05-21 DIAGNOSIS — E11.9 TYPE 2 DIABETES MELLITUS W/OUT COMPLICATIONS: ICD-10-CM

## 2024-05-21 PROCEDURE — 99349 HOME/RES VST EST MOD MDM 40: CPT

## 2024-05-22 PROBLEM — Z91.81 HISTORY OF FALL: Status: ACTIVE | Noted: 2024-05-22

## 2024-05-22 PROBLEM — I10 ESSENTIAL HYPERTENSION: Status: ACTIVE | Noted: 2024-05-22

## 2024-05-23 PROBLEM — E78.49 OTHER HYPERLIPIDEMIA: Status: ACTIVE | Noted: 2024-05-23

## 2024-05-23 RX ORDER — IBUPROFEN, ACETAMINOPHEN 125; 250 MG/1; MG/1
125-250 TABLET, FILM COATED ORAL EVERY 6 HOURS
Refills: 0 | Status: ACTIVE | COMMUNITY
Start: 2024-05-23

## 2024-05-23 RX ORDER — ROSUVASTATIN CALCIUM 20 MG/1
20 TABLET, FILM COATED ORAL DAILY
Refills: 0 | Status: ACTIVE | COMMUNITY
Start: 2024-05-23

## 2024-05-23 RX ORDER — OXYCODONE 5 MG/1
5 TABLET ORAL
Refills: 0 | Status: ACTIVE | COMMUNITY
Start: 2024-05-23

## 2024-05-23 RX ORDER — ERTUGLIFLOZIN AND METFORMIN HYDROCHLORIDE 7.5; 1 MG/1; MG/1
7.5-1 TABLET, FILM COATED ORAL DAILY
Refills: 0 | Status: ACTIVE | COMMUNITY
Start: 2024-05-23

## 2024-05-23 RX ORDER — SEMAGLUTIDE 0.5 MG/.5ML
0.5 INJECTION, SOLUTION SUBCUTANEOUS WEEKLY
Refills: 0 | Status: ACTIVE | COMMUNITY
Start: 2024-05-23

## 2024-05-23 NOTE — COUNSELING
[Fall prevention counseling provided] : Fall prevention counseling provided [Adequate lighting] : Adequate lighting [No throw rugs] : No throw rugs [Use proper foot wear] : Use proper foot wear [Use recommended devices] : Use recommended devices [FreeTextEntry1] : walker and PT TIW

## 2024-05-23 NOTE — HEALTH RISK ASSESSMENT
[Patient/Caregiver not ready to engage] : , patient/caregiver not ready to engage [I will adhere to the patient's wishes.] : I will adhere to the patient's wishes. [Time Spent: ___ minutes] : Time Spent: [unfilled] minutes [Never] : Never [No] : No [Any fall with injury in past year] : Patient reported fall with injury in the past year [Assistive Device] : Patient uses an assistive device [Little interest or pleasure doing things] : 1) Little interest or pleasure doing things [Feeling down, depressed, or hopeless] : 2) Feeling down, depressed, or hopeless [0] : 2) Feeling down, depressed, or hopeless: Not at all (0) [PHQ-2 Negative - No further assessment needed] : PHQ-2 Negative - No further assessment needed [Diabetic Diet] : diabetic [Low Salt Diet] : low salt [General Adherence] : general adherence [None] : None [With Significant Other] : lives with significant other [# of Members in Household ___] :  household currently consist of [unfilled] member(s) [] :  [# Of Children ___] : has [unfilled] children [Fully functional (bathing, dressing, toileting, transferring, walking, feeding)] : Fully functional (bathing, dressing, toileting, transferring, walking, feeding) [Reports normal functional visual acuity (ie: able to read med bottle)] : Reports normal functional visual acuity [de-identified] : fell on 5/16/24 [de-identified] : kika [[id number]] : 0 [Change in mental status noted] : No change in mental status noted [Language] : denies difficulty with language [Behavior] : denies difficulty with behavior [Learning/Retaining New Information] : denies difficulty learning/retaining new information [Handling Complex Tasks] : denies difficulty handling complex tasks [Reasoning] : denies difficulty with reasoning [Spatial Ability and Orientation] : denies difficulty with spatial ability and orientation [Reports changes in hearing] : Reports no changes in hearing [Reports changes in vision] : Reports no changes in vision [AdvancecareDate] : 05/24 [FreeTextEntry4] : Pt not ready to discuss at this time

## 2024-05-23 NOTE — PHYSICAL EXAM
[No Acute Distress] : no acute distress [Well Nourished] : well nourished [Well Developed] : well developed [Well-Appearing] : well-appearing [Normal Voice/Communication] : normal voice/communication [Normal Outer Ear/Nose] : the outer ears and nose were normal in appearance [No JVD] : no jugular venous distention [No Respiratory Distress] : no respiratory distress  [Clear to Auscultation] : lungs were clear to auscultation bilaterally [No Accessory Muscle Use] : no accessory muscle use [Normal Rate] : normal rate  [Regular Rhythm] : with a regular rhythm [Normal S1, S2] : normal S1 and S2 [Soft] : abdomen soft [Non Tender] : non-tender [Non-distended] : non-distended [Normal Bowel Sounds] : normal bowel sounds [No Rash] : no rash [Normal Affect] : the affect was normal [Alert and Oriented x3] : oriented to person, place, and time [Normal Mood] : the mood was normal [de-identified] : glasses [de-identified] : edema  [de-identified] : uses walker

## 2024-05-23 NOTE — HISTORY OF PRESENT ILLNESS
[Post-hospitalization from ___ Hospital] : Post-hospitalization from [unfilled] Hospital [Admitted on: ___] : The patient was admitted on [unfilled] [Discharged on ___] : discharged on [unfilled] [Discharge Summary] : discharge summary [Discharge Med List] : discharge medication list [Other: ____] : [unfilled] [Med Reconciliation] : medication reconciliation has been completed [Patient Contacted By: ____] : and contacted by [unfilled] [FreeTextEntry2] : 74 yr old female with PMH of HTN, DM type 2, Hashimoto's thyroiditis, seasonal allergies, anxiety. Pt reports right hip pain ~2 yr, pain with sitting long period of time, and standing. Pain scale 9/10, with OTC use 6/10. Pt evaluated by Dr. Hayward for a scheduled right total hip arthroplasty, posterior approach on 5/14/2024. Pt denies fevers, chills, h/a, c/p or SOB at this time.  Goals of Care: I want to walk without pain PAST MEDICAL HISTORY: Anxiety  DM (diabetes mellitus), type 2  H/O Hashimoto thyroiditis  History of glaucoma  HTN (hypertension)  Seasonal allergies.  PAST SURGICAL HISTORY: S/P arthroscopy  S/P colonoscopy. Hospital Course: Patient was admitted on 5/14. Hospitalist was consulted preoperatively for medical comanagement during this hospital admission.  After receiving pre-operative parenteral prophylactic antibiotics, the patient underwent an uncomplicated Right total hip replacement with Dr. Hayward on 5/14. Patient tolerated the procedure well and was transferred to the recovery room in stable condition, with a stable neuro/vascular exam of the operated extremity. Patient was placed on Aspirin 81 mg for DVT ppx, and was placed on Protonix 40 mg for GI protection.  Patient was made weight bearing as tolerated with the operative leg.  Typical Physical & occupational therapy modalities post surgery were performed by physical and occupational therapies, including ambulation training, range of motion, ADL's, abd transfers. After progression of mobility guided by the PT/ OT staff,  the patient was felt to benefit from further rehabilitative care for restoration to level of function. This was felt to best be accomplished at home with home PT.  Discharge and Orthopedic Care instructions were delineated in the Discharge Plan and reviewed with the patient. All medications were delineated in the medication reconciliation tool and key points were reviewed with the patient. They were deemed stable from an Orthopedic & medical standpoint for discharge on 5/15/2024  75 y/o female seen today for [facility] TCM visit. She is awake, alert and oriented x 3, in no acute distress. She has some pain to her hip rates 4/10. Pain level is acceptable to her. Uses walker and will resume PT TIW.  [place] present in the home.  Pt had RTHA on 5/14/24, fell on 5/16/24 and was treated and released from ED on 5/19/24 found to have non- displaced right greater trochanter fracture.  Instructed to follow up with ortho. Seen by Dr Hayward 5/20/24. Pt has Rx for raised toilet seat confirmed with ORTHO can be filled at surgical supply.  Medication review done, along with follow up appts. TCM explained and yellow card left in the home.

## 2024-05-23 NOTE — PLAN
[FreeTextEntry1] : fall precautions,  resume PT use all assistive devices follow up with ortho follow up with PCP obtain toilet seat

## 2024-05-23 NOTE — CURRENT MEDS
[Takes medication as prescribed] : takes [None] : Patient does not have any barriers to medication adherence [Yes] : Reviewed medication list for presence of high-risk medications. [Opioids] : opioids [Blood Thinners] : blood thinners [Sedatives] : sedatives

## 2024-05-23 NOTE — REVIEW OF SYSTEMS
[Pain] : pain [Lower Ext Edema] : lower extremity edema [Nausea] : nausea [Joint Pain] : joint pain [Joint Stiffness] : joint stiffness [Muscle Weakness] : muscle weakness [Back Pain] : back pain [Negative] : Heme/Lymph [Fever] : no fever [Vision Problems] : vision problems [Hearing Loss] : no hearing loss [Chest Pain] : no chest pain [Orthopnea] : no orthopnea [Abdominal Pain] : no abdominal pain [Constipation] : no constipation [Diarrhea] : diarrhea [Vomiting] : no vomiting [Heartburn] : no heartburn [Melena] : no melena [Incontinence] : no incontinence [Hematuria] : no hematuria [FreeTextEntry2] : decrease in appetite [FreeTextEntry3] : glaucoma both eyes [FreeTextEntry5] : RLE edema [FreeTextEntry7] : sometimes with exertion [FreeTextEntry9] : on right side [de-identified] : small scratch to right second toe

## 2024-05-23 NOTE — ASU PATIENT PROFILE, ADULT - PATIENT'S PREFERRED PRONOUN
----- Message from Arielle Braxton sent at 5/23/2024 12:29 PM CDT -----  Contact: self  [phone]  Patient called in this afternoon requesting a call back to go over her results. Please call back  [phone]. Thanks tpw.   Her/She

## 2024-05-29 ENCOUNTER — TRANSCRIPTION ENCOUNTER (OUTPATIENT)
Age: 74
End: 2024-05-29

## 2024-06-07 ENCOUNTER — TRANSCRIPTION ENCOUNTER (OUTPATIENT)
Age: 74
End: 2024-06-07

## 2024-06-10 ENCOUNTER — APPOINTMENT (OUTPATIENT)
Dept: ORTHOPEDIC SURGERY | Facility: CLINIC | Age: 74
End: 2024-06-10
Payer: MEDICARE

## 2024-06-10 DIAGNOSIS — S72.111A DISPLACED FRACTURE OF GREATER TROCHANTER OF RIGHT FEMUR, INITIAL ENCOUNTER FOR CLOSED FRACTURE: ICD-10-CM

## 2024-06-10 DIAGNOSIS — M16.11 UNILATERAL PRIMARY OSTEOARTHRITIS, RIGHT HIP: ICD-10-CM

## 2024-06-10 DIAGNOSIS — Z96.641 PRESENCE OF RIGHT ARTIFICIAL HIP JOINT: ICD-10-CM

## 2024-06-10 PROCEDURE — 73502 X-RAY EXAM HIP UNI 2-3 VIEWS: CPT

## 2024-06-10 PROCEDURE — 99024 POSTOP FOLLOW-UP VISIT: CPT

## 2024-06-14 PROBLEM — Z96.641 STATUS POST TOTAL REPLACEMENT OF RIGHT HIP: Status: ACTIVE | Noted: 2024-05-20

## 2024-06-14 PROBLEM — S72.111A: Status: ACTIVE | Noted: 2024-05-20

## 2024-06-14 PROBLEM — M16.11 ARTHRITIS OF RIGHT HIP: Status: ACTIVE | Noted: 2024-04-08

## 2024-06-18 ENCOUNTER — TRANSCRIPTION ENCOUNTER (OUTPATIENT)
Age: 74
End: 2024-06-18

## 2024-07-05 ENCOUNTER — TRANSCRIPTION ENCOUNTER (OUTPATIENT)
Age: 74
End: 2024-07-05

## 2024-07-08 ENCOUNTER — APPOINTMENT (OUTPATIENT)
Dept: ORTHOPEDIC SURGERY | Facility: CLINIC | Age: 74
End: 2024-07-08
Payer: MEDICARE

## 2024-07-08 DIAGNOSIS — S72.111A DISPLACED FRACTURE OF GREATER TROCHANTER OF RIGHT FEMUR, INITIAL ENCOUNTER FOR CLOSED FRACTURE: ICD-10-CM

## 2024-07-08 DIAGNOSIS — Z96.641 PRESENCE OF RIGHT ARTIFICIAL HIP JOINT: ICD-10-CM

## 2024-07-08 PROCEDURE — 99024 POSTOP FOLLOW-UP VISIT: CPT

## 2024-07-08 PROCEDURE — 72170 X-RAY EXAM OF PELVIS: CPT

## 2024-07-30 ENCOUNTER — TRANSCRIPTION ENCOUNTER (OUTPATIENT)
Age: 74
End: 2024-07-30

## 2024-09-09 ENCOUNTER — APPOINTMENT (OUTPATIENT)
Dept: ORTHOPEDIC SURGERY | Facility: CLINIC | Age: 74
End: 2024-09-09
Payer: MEDICARE

## 2024-09-09 DIAGNOSIS — Z96.641 PRESENCE OF RIGHT ARTIFICIAL HIP JOINT: ICD-10-CM

## 2024-09-09 DIAGNOSIS — M75.42 IMPINGEMENT SYNDROME OF LEFT SHOULDER: ICD-10-CM

## 2024-09-09 PROCEDURE — 72170 X-RAY EXAM OF PELVIS: CPT

## 2024-09-09 PROCEDURE — 99213 OFFICE O/P EST LOW 20 MIN: CPT

## 2024-12-09 ENCOUNTER — APPOINTMENT (OUTPATIENT)
Dept: ORTHOPEDIC SURGERY | Facility: CLINIC | Age: 74
End: 2024-12-09
Payer: MEDICARE

## 2024-12-09 DIAGNOSIS — Z96.641 PRESENCE OF RIGHT ARTIFICIAL HIP JOINT: ICD-10-CM

## 2024-12-09 DIAGNOSIS — M75.42 IMPINGEMENT SYNDROME OF LEFT SHOULDER: ICD-10-CM

## 2024-12-09 PROCEDURE — 99214 OFFICE O/P EST MOD 30 MIN: CPT | Mod: 25

## 2024-12-09 PROCEDURE — 20610 DRAIN/INJ JOINT/BURSA W/O US: CPT | Mod: LT

## 2024-12-09 PROCEDURE — 73502 X-RAY EXAM HIP UNI 2-3 VIEWS: CPT | Mod: LT

## 2025-04-09 ENCOUNTER — APPOINTMENT (OUTPATIENT)
Dept: GYNECOLOGIC ONCOLOGY | Facility: CLINIC | Age: 75
End: 2025-04-09

## 2025-04-09 DIAGNOSIS — N95.2 POSTMENOPAUSAL ATROPHIC VAGINITIS: ICD-10-CM

## 2025-04-09 DIAGNOSIS — N88.2 STRICTURE AND STENOSIS OF CERVIX UTERI: ICD-10-CM

## 2025-04-09 DIAGNOSIS — N85.2 HYPERTROPHY OF UTERUS: ICD-10-CM

## 2025-04-09 DIAGNOSIS — N95.0 POSTMENOPAUSAL BLEEDING: ICD-10-CM

## 2025-04-09 DIAGNOSIS — R93.89 ABNORMAL FINDINGS ON DIAGNOSTIC IMAGING OF OTHER SPECIFIED BODY STRUCTURES: ICD-10-CM

## 2025-04-09 PROCEDURE — 99204 OFFICE O/P NEW MOD 45 MIN: CPT

## 2025-04-09 PROCEDURE — 99459 PELVIC EXAMINATION: CPT

## 2025-04-13 PROBLEM — N88.2 CERVICAL STENOSIS (UTERINE CERVIX): Status: ACTIVE | Noted: 2025-04-13

## 2025-04-16 ENCOUNTER — APPOINTMENT (OUTPATIENT)
Dept: ULTRASOUND IMAGING | Facility: CLINIC | Age: 75
End: 2025-04-16
Payer: MEDICARE

## 2025-04-16 PROCEDURE — 76830 TRANSVAGINAL US NON-OB: CPT

## 2025-04-17 ENCOUNTER — OUTPATIENT (OUTPATIENT)
Dept: OUTPATIENT SERVICES | Facility: HOSPITAL | Age: 75
LOS: 1 days | End: 2025-04-17
Payer: MEDICARE

## 2025-04-17 ENCOUNTER — APPOINTMENT (OUTPATIENT)
Dept: CT IMAGING | Facility: IMAGING CENTER | Age: 75
End: 2025-04-17

## 2025-04-17 DIAGNOSIS — Z98.890 OTHER SPECIFIED POSTPROCEDURAL STATES: Chronic | ICD-10-CM

## 2025-04-17 DIAGNOSIS — Z00.8 ENCOUNTER FOR OTHER GENERAL EXAMINATION: ICD-10-CM

## 2025-04-17 PROCEDURE — 74177 CT ABD & PELVIS W/CONTRAST: CPT | Mod: 26

## 2025-04-17 PROCEDURE — 74177 CT ABD & PELVIS W/CONTRAST: CPT | Mod: MH

## 2025-04-22 ENCOUNTER — TRANSCRIPTION ENCOUNTER (OUTPATIENT)
Age: 75
End: 2025-04-22

## 2025-04-23 ENCOUNTER — APPOINTMENT (OUTPATIENT)
Dept: GYNECOLOGIC ONCOLOGY | Facility: CLINIC | Age: 75
End: 2025-04-23

## 2025-04-23 DIAGNOSIS — R93.89 ABNORMAL FINDINGS ON DIAGNOSTIC IMAGING OF OTHER SPECIFIED BODY STRUCTURES: ICD-10-CM

## 2025-04-23 DIAGNOSIS — N85.2 HYPERTROPHY OF UTERUS: ICD-10-CM

## 2025-04-23 DIAGNOSIS — N88.2 STRICTURE AND STENOSIS OF CERVIX UTERI: ICD-10-CM

## 2025-04-23 PROCEDURE — 99213 OFFICE O/P EST LOW 20 MIN: CPT | Mod: 2W

## 2025-04-25 ENCOUNTER — OUTPATIENT (OUTPATIENT)
Dept: OUTPATIENT SERVICES | Facility: HOSPITAL | Age: 75
LOS: 1 days | End: 2025-04-25

## 2025-04-25 VITALS
OXYGEN SATURATION: 100 % | HEIGHT: 62 IN | TEMPERATURE: 98 F | HEART RATE: 75 BPM | SYSTOLIC BLOOD PRESSURE: 114 MMHG | WEIGHT: 156.97 LBS | RESPIRATION RATE: 16 BRPM | DIASTOLIC BLOOD PRESSURE: 78 MMHG

## 2025-04-25 DIAGNOSIS — N85.2 HYPERTROPHY OF UTERUS: ICD-10-CM

## 2025-04-25 DIAGNOSIS — R93.89 ABNORMAL FINDINGS ON DIAGNOSTIC IMAGING OF OTHER SPECIFIED BODY STRUCTURES: ICD-10-CM

## 2025-04-25 DIAGNOSIS — Z98.890 OTHER SPECIFIED POSTPROCEDURAL STATES: Chronic | ICD-10-CM

## 2025-04-25 DIAGNOSIS — Z96.641 PRESENCE OF RIGHT ARTIFICIAL HIP JOINT: Chronic | ICD-10-CM

## 2025-04-25 DIAGNOSIS — E11.9 TYPE 2 DIABETES MELLITUS WITHOUT COMPLICATIONS: ICD-10-CM

## 2025-04-25 DIAGNOSIS — N88.2 STRICTURE AND STENOSIS OF CERVIX UTERI: ICD-10-CM

## 2025-04-25 LAB
A1C WITH ESTIMATED AVERAGE GLUCOSE RESULT: 6.4 % — HIGH (ref 4–5.6)
ANION GAP SERPL CALC-SCNC: 14 MMOL/L — SIGNIFICANT CHANGE UP (ref 7–14)
BASOPHILS # BLD AUTO: 0.06 K/UL — SIGNIFICANT CHANGE UP (ref 0–0.2)
BASOPHILS NFR BLD AUTO: 0.7 % — SIGNIFICANT CHANGE UP (ref 0–2)
BLD GP AB SCN SERPL QL: NEGATIVE — SIGNIFICANT CHANGE UP
BUN SERPL-MCNC: 23 MG/DL — SIGNIFICANT CHANGE UP (ref 7–23)
CALCIUM SERPL-MCNC: 10.6 MG/DL — HIGH (ref 8.4–10.5)
CHLORIDE SERPL-SCNC: 105 MMOL/L — SIGNIFICANT CHANGE UP (ref 98–107)
CO2 SERPL-SCNC: 24 MMOL/L — SIGNIFICANT CHANGE UP (ref 22–31)
CREAT SERPL-MCNC: 0.9 MG/DL — SIGNIFICANT CHANGE UP (ref 0.5–1.3)
EGFR: 68 ML/MIN/1.73M2 — SIGNIFICANT CHANGE UP
EGFR: 68 ML/MIN/1.73M2 — SIGNIFICANT CHANGE UP
EOSINOPHIL # BLD AUTO: 0.18 K/UL — SIGNIFICANT CHANGE UP (ref 0–0.5)
EOSINOPHIL NFR BLD AUTO: 2.1 % — SIGNIFICANT CHANGE UP (ref 0–6)
ESTIMATED AVERAGE GLUCOSE: 137 — SIGNIFICANT CHANGE UP
GLUCOSE SERPL-MCNC: 117 MG/DL — HIGH (ref 70–99)
HCT VFR BLD CALC: 42.6 % — SIGNIFICANT CHANGE UP (ref 34.5–45)
HGB BLD-MCNC: 14.1 G/DL — SIGNIFICANT CHANGE UP (ref 11.5–15.5)
IMM GRANULOCYTES NFR BLD AUTO: 0.3 % — SIGNIFICANT CHANGE UP (ref 0–0.9)
LYMPHOCYTES # BLD AUTO: 2.96 K/UL — SIGNIFICANT CHANGE UP (ref 1–3.3)
LYMPHOCYTES # BLD AUTO: 33.8 % — SIGNIFICANT CHANGE UP (ref 13–44)
MCHC RBC-ENTMCNC: 29.9 PG — SIGNIFICANT CHANGE UP (ref 27–34)
MCHC RBC-ENTMCNC: 33.1 G/DL — SIGNIFICANT CHANGE UP (ref 32–36)
MCV RBC AUTO: 90.3 FL — SIGNIFICANT CHANGE UP (ref 80–100)
MONOCYTES # BLD AUTO: 0.85 K/UL — SIGNIFICANT CHANGE UP (ref 0–0.9)
MONOCYTES NFR BLD AUTO: 9.7 % — SIGNIFICANT CHANGE UP (ref 2–14)
NEUTROPHILS # BLD AUTO: 4.69 K/UL — SIGNIFICANT CHANGE UP (ref 1.8–7.4)
NEUTROPHILS NFR BLD AUTO: 53.4 % — SIGNIFICANT CHANGE UP (ref 43–77)
PLATELET # BLD AUTO: 350 K/UL — SIGNIFICANT CHANGE UP (ref 150–400)
POTASSIUM SERPL-MCNC: 5.8 MMOL/L — HIGH (ref 3.5–5.3)
POTASSIUM SERPL-SCNC: 5.8 MMOL/L — HIGH (ref 3.5–5.3)
RBC # BLD: 4.72 M/UL — SIGNIFICANT CHANGE UP (ref 3.8–5.2)
RBC # FLD: 12.8 % — SIGNIFICANT CHANGE UP (ref 10.3–14.5)
RH IG SCN BLD-IMP: POSITIVE — SIGNIFICANT CHANGE UP
RH IG SCN BLD-IMP: POSITIVE — SIGNIFICANT CHANGE UP
SODIUM SERPL-SCNC: 143 MMOL/L — SIGNIFICANT CHANGE UP (ref 135–145)
WBC # BLD: 8.77 K/UL — SIGNIFICANT CHANGE UP (ref 3.8–10.5)
WBC # FLD AUTO: 8.77 K/UL — SIGNIFICANT CHANGE UP (ref 3.8–10.5)

## 2025-04-25 RX ORDER — ORAL SEMAGLUTIDE 14 MG/1
0.5 TABLET ORAL
Refills: 0 | DISCHARGE

## 2025-04-25 RX ORDER — SODIUM CHLORIDE 9 G/1000ML
1000 INJECTION, SOLUTION INTRAVENOUS
Refills: 0 | Status: DISCONTINUED | OUTPATIENT
Start: 2025-05-01 | End: 2025-05-02

## 2025-04-25 NOTE — H&P PST ADULT - RESPIRATORY AND THORAX
Try adding arch support to shoes, call for podiatry referral if that doesn't help    We will start physical therapy, their office will call you to schedule first visit.   details…

## 2025-04-25 NOTE — H&P PST ADULT - HISTORY OF PRESENT ILLNESS
Pt present in Presurgical testing for preop evaluation for scheduled procedure exploratory laparotomy total abdominal hysterectomy bilateral salpingo oophorectomy possible stagging spy camera     75 year old  female, c/o postmenopausal bleeding,  Pt went to GYN Dr. Leela Burgos, endometrial bx was attempted but unsuccessful due to stenotic cervical os.     2025 TVUS Uterus: 13.9 x 11.0 x 9.3 cm Endometrial echo: 7.55 cm Ovaries not visualized No free fluid in the cul-de-sac Impression: Transvaginal pelvic sonogram with endometrial echo of 7.55 cm    Pt  was referred to Surgeon, 25 pt was evaluated by surgeon  25 US trans=vaginal IMPRESSION: Enlarged uterus with large endometrial mass measuring up to 8.2 cm, concerning for malignancy. Hemorrhagic fluid within the endometrial cavity adjacent to the mass.    25 Ct of abdomen and pelvis Impression: Endometrial cavity markedly distended with large endometrial right sided mass   Pt was dx with hypertrophy of the uterus and cervical stenosis, surgical intervention recommended

## 2025-04-25 NOTE — H&P PST ADULT - PROBLEM SELECTOR PLAN 1
Scheduled for exploratory laparotomy total abdominal hysterectomy bilateral salpingo oophorectomy possible stagging spy camera  Preop instructions provided and patient verbalizes understanding.  Hibiclens and Famotidine provided with instructions.   BMP, CBC, HgAIC, T&S results  pending Scheduled for exploratory laparotomy total abdominal hysterectomy bilateral salpingo oophorectomy possible stagging spy camera  Preop instructions provided and patient verbalizes understanding.  Hibiclens and Famotidine provided with instructions.   BMP, CBC, HgAIC, T&S results  pending    Pt instructed to take Liothyronine, levothyroxine  Metoprolol, Amlodipine and Rosuvastatin am of surgery

## 2025-04-25 NOTE — H&P PST ADULT - NSICDXPASTMEDICALHX_GEN_ALL_CORE_FT
PAST MEDICAL HISTORY:  Anxiety     DM (diabetes mellitus), type 2     H/O Hashimoto thyroiditis     History of glaucoma     HTN (hypertension)     Hypertrophy, uterus     Postmenopause bleeding     Seasonal allergies      PAST MEDICAL HISTORY:  Allergic asthma     Anxiety     DM (diabetes mellitus), type 2     H/O Hashimoto thyroiditis     History of glaucoma     HTN (hypertension)     Hypertrophy, uterus     Postmenopause bleeding     Seasonal allergies

## 2025-04-25 NOTE — H&P PST ADULT - ATTENDING COMMENTS
Seen in [facility] with her . Dr Giron present as well. F/U labs nl per MS. Disc her c-scope with him as well. Planned proc reviewed, incl EUA by learner, VI, hysterectomy, BSO, poss LNS (SLNM) or other biopsies/procedures. Consent form reviewed. All Q/A. Case d/w Anesth team and OR Circ RN.

## 2025-04-25 NOTE — H&P PST ADULT - GENITOURINARY COMMENTS
post menopausal vaginal bleeding, cervix stenosis, hypertrophy of the uterus cervix stenosis, hypertrophy of the uterus

## 2025-04-25 NOTE — H&P PST ADULT - PROBLEM SELECTOR PLAN 2
Pt instructed no Prandin am of surgery  Last dose Ozempic 4/21/2025  Last dose Farxiga 4/27/25  Pt was no fort coming with DM medication does not want insulin in hospital
[id number]

## 2025-04-25 NOTE — H&P PST ADULT - NEGATIVE ENMT SYMPTOMS
no hearing difficulty/no ear pain/no vertigo/no sinus symptoms/no nasal congestion/no nasal discharge/no nasal obstruction/no post-nasal discharge/no nose bleeds/no recurrent cold sores/no gum bleeding/no dry mouth/no throat pain/no dysphagia

## 2025-05-01 ENCOUNTER — INPATIENT (INPATIENT)
Facility: HOSPITAL | Age: 75
LOS: 1 days | Discharge: HOME | End: 2025-05-03
Attending: OBSTETRICS & GYNECOLOGY | Admitting: OBSTETRICS & GYNECOLOGY
Payer: MEDICARE

## 2025-05-01 ENCOUNTER — APPOINTMENT (OUTPATIENT)
Dept: GYNECOLOGIC ONCOLOGY | Facility: HOSPITAL | Age: 75
End: 2025-05-01

## 2025-05-01 VITALS
WEIGHT: 158.07 LBS | OXYGEN SATURATION: 100 % | HEIGHT: 62 IN | RESPIRATION RATE: 16 BRPM | DIASTOLIC BLOOD PRESSURE: 70 MMHG | SYSTOLIC BLOOD PRESSURE: 104 MMHG | TEMPERATURE: 98 F | HEART RATE: 79 BPM

## 2025-05-01 DIAGNOSIS — Z98.890 OTHER SPECIFIED POSTPROCEDURAL STATES: Chronic | ICD-10-CM

## 2025-05-01 DIAGNOSIS — R93.89 ABNORMAL FINDINGS ON DIAGNOSTIC IMAGING OF OTHER SPECIFIED BODY STRUCTURES: ICD-10-CM

## 2025-05-01 DIAGNOSIS — Z96.641 PRESENCE OF RIGHT ARTIFICIAL HIP JOINT: Chronic | ICD-10-CM

## 2025-05-01 LAB
ANION GAP SERPL CALC-SCNC: 12 MMOL/L — SIGNIFICANT CHANGE UP (ref 7–14)
BUN SERPL-MCNC: 12 MG/DL — SIGNIFICANT CHANGE UP (ref 7–23)
CALCIUM SERPL-MCNC: 9 MG/DL — SIGNIFICANT CHANGE UP (ref 8.4–10.5)
CHLORIDE SERPL-SCNC: 106 MMOL/L — SIGNIFICANT CHANGE UP (ref 98–107)
CO2 SERPL-SCNC: 20 MMOL/L — LOW (ref 22–31)
CREAT SERPL-MCNC: 0.65 MG/DL — SIGNIFICANT CHANGE UP (ref 0.5–1.3)
EGFR: 93 ML/MIN/1.73M2 — SIGNIFICANT CHANGE UP
EGFR: 93 ML/MIN/1.73M2 — SIGNIFICANT CHANGE UP
GLUCOSE BLDC GLUCOMTR-MCNC: 141 MG/DL — HIGH (ref 70–99)
GLUCOSE BLDC GLUCOMTR-MCNC: 167 MG/DL — HIGH (ref 70–99)
GLUCOSE BLDC GLUCOMTR-MCNC: 173 MG/DL — HIGH (ref 70–99)
GLUCOSE BLDC GLUCOMTR-MCNC: 186 MG/DL — HIGH (ref 70–99)
GLUCOSE SERPL-MCNC: 187 MG/DL — HIGH (ref 70–99)
HCT VFR BLD CALC: 36.7 % — SIGNIFICANT CHANGE UP (ref 34.5–45)
HGB BLD-MCNC: 12.3 G/DL — SIGNIFICANT CHANGE UP (ref 11.5–15.5)
MCHC RBC-ENTMCNC: 29.5 PG — SIGNIFICANT CHANGE UP (ref 27–34)
MCHC RBC-ENTMCNC: 33.5 G/DL — SIGNIFICANT CHANGE UP (ref 32–36)
MCV RBC AUTO: 88 FL — SIGNIFICANT CHANGE UP (ref 80–100)
NRBC # BLD AUTO: 0 K/UL — SIGNIFICANT CHANGE UP (ref 0–0)
NRBC # FLD: 0 K/UL — SIGNIFICANT CHANGE UP (ref 0–0)
NRBC BLD AUTO-RTO: 0 /100 WBCS — SIGNIFICANT CHANGE UP (ref 0–0)
PLATELET # BLD AUTO: 237 K/UL — SIGNIFICANT CHANGE UP (ref 150–400)
POTASSIUM SERPL-MCNC: 4 MMOL/L — SIGNIFICANT CHANGE UP (ref 3.5–5.3)
POTASSIUM SERPL-SCNC: 4 MMOL/L — SIGNIFICANT CHANGE UP (ref 3.5–5.3)
RBC # BLD: 4.17 M/UL — SIGNIFICANT CHANGE UP (ref 3.8–5.2)
RBC # FLD: 12.3 % — SIGNIFICANT CHANGE UP (ref 10.3–14.5)
SODIUM SERPL-SCNC: 138 MMOL/L — SIGNIFICANT CHANGE UP (ref 135–145)
WBC # BLD: 12.12 K/UL — HIGH (ref 3.8–10.5)
WBC # FLD AUTO: 12.12 K/UL — HIGH (ref 3.8–10.5)

## 2025-05-01 PROCEDURE — 88305 TISSUE EXAM BY PATHOLOGIST: CPT | Mod: 26

## 2025-05-01 PROCEDURE — 58200 EXTENSIVE HYSTERECTOMY: CPT

## 2025-05-01 PROCEDURE — 88307 TISSUE EXAM BY PATHOLOGIST: CPT | Mod: 26

## 2025-05-01 PROCEDURE — 38900 IO MAP OF SENT LYMPH NODE: CPT | Mod: 50

## 2025-05-01 PROCEDURE — 38770 REMOVE PELVIS LYMPH NODES: CPT | Mod: 50

## 2025-05-01 PROCEDURE — 88341 IMHCHEM/IMCYTCHM EA ADD ANTB: CPT | Mod: 26

## 2025-05-01 PROCEDURE — 88342 IMHCHEM/IMCYTCHM 1ST ANTB: CPT | Mod: 26

## 2025-05-01 DEVICE — ARISTA 3GR: Type: IMPLANTABLE DEVICE | Site: BILATERAL | Status: FUNCTIONAL

## 2025-05-01 DEVICE — SURGICEL FIBRILLAR 2 X 4": Type: IMPLANTABLE DEVICE | Site: BILATERAL | Status: FUNCTIONAL

## 2025-05-01 DEVICE — LIGATING CLIPS WECK HORIZON LARGE (ORANGE) 24: Type: IMPLANTABLE DEVICE | Site: BILATERAL | Status: FUNCTIONAL

## 2025-05-01 DEVICE — LIGATING CLIPS WECK HORIZON MEDIUM (BLUE) 24: Type: IMPLANTABLE DEVICE | Site: BILATERAL | Status: FUNCTIONAL

## 2025-05-01 RX ORDER — ONDANSETRON HCL/PF 4 MG/2 ML
8 VIAL (ML) INJECTION EVERY 8 HOURS
Refills: 0 | Status: DISCONTINUED | OUTPATIENT
Start: 2025-05-01 | End: 2025-05-03

## 2025-05-01 RX ORDER — TRAMADOL HYDROCHLORIDE 50 MG/1
50 TABLET, FILM COATED ORAL ONCE
Refills: 0 | Status: DISCONTINUED | OUTPATIENT
Start: 2025-05-01 | End: 2025-05-01

## 2025-05-01 RX ORDER — DEXTROSE 50 % IN WATER 50 %
25 SYRINGE (ML) INTRAVENOUS ONCE
Refills: 0 | Status: DISCONTINUED | OUTPATIENT
Start: 2025-05-01 | End: 2025-05-02

## 2025-05-01 RX ORDER — HEPARIN SODIUM 1000 [USP'U]/ML
5000 INJECTION INTRAVENOUS; SUBCUTANEOUS EVERY 8 HOURS
Refills: 0 | Status: DISCONTINUED | OUTPATIENT
Start: 2025-05-01 | End: 2025-05-03

## 2025-05-01 RX ORDER — SODIUM CHLORIDE 9 G/1000ML
1000 INJECTION, SOLUTION INTRAVENOUS
Refills: 0 | Status: DISCONTINUED | OUTPATIENT
Start: 2025-05-01 | End: 2025-05-02

## 2025-05-01 RX ORDER — SIMETHICONE 80 MG
80 TABLET,CHEWABLE ORAL EVERY 6 HOURS
Refills: 0 | Status: DISCONTINUED | OUTPATIENT
Start: 2025-05-01 | End: 2025-05-03

## 2025-05-01 RX ORDER — LEVOTHYROXINE SODIUM 300 MCG
1 TABLET ORAL
Refills: 0 | DISCHARGE

## 2025-05-01 RX ORDER — INSULIN LISPRO 100 U/ML
INJECTION, SOLUTION INTRAVENOUS; SUBCUTANEOUS
Refills: 0 | Status: DISCONTINUED | OUTPATIENT
Start: 2025-05-01 | End: 2025-05-02

## 2025-05-01 RX ORDER — IBUPROFEN 200 MG
1 TABLET ORAL
Qty: 0 | Refills: 0 | DISCHARGE

## 2025-05-01 RX ORDER — ORAL SEMAGLUTIDE 14 MG/1
1.5 TABLET ORAL
Refills: 0 | DISCHARGE

## 2025-05-01 RX ORDER — APIXABAN 2.5 MG/1
1 TABLET, FILM COATED ORAL
Qty: 60 | Refills: 0
Start: 2025-05-01 | End: 2025-05-30

## 2025-05-01 RX ORDER — ACETAMINOPHEN 500 MG/5ML
1000 LIQUID (ML) ORAL EVERY 6 HOURS
Refills: 0 | Status: DISCONTINUED | OUTPATIENT
Start: 2025-05-01 | End: 2025-05-02

## 2025-05-01 RX ORDER — OXYCODONE HYDROCHLORIDE 30 MG/1
5 TABLET ORAL
Refills: 0 | Status: DISCONTINUED | OUTPATIENT
Start: 2025-05-01 | End: 2025-05-03

## 2025-05-01 RX ORDER — METOPROLOL SUCCINATE 50 MG/1
5 TABLET, EXTENDED RELEASE ORAL EVERY 6 HOURS
Refills: 0 | Status: DISCONTINUED | OUTPATIENT
Start: 2025-05-01 | End: 2025-05-03

## 2025-05-01 RX ORDER — HYDROMORPHONE/SOD CHLOR,ISO/PF 2 MG/10 ML
0.5 SYRINGE (ML) INJECTION
Refills: 0 | Status: DISCONTINUED | OUTPATIENT
Start: 2025-05-01 | End: 2025-05-01

## 2025-05-01 RX ORDER — GLUCAGON 3 MG/1
1 POWDER NASAL ONCE
Refills: 0 | Status: DISCONTINUED | OUTPATIENT
Start: 2025-05-01 | End: 2025-05-02

## 2025-05-01 RX ORDER — ALBUTEROL SULFATE 2.5 MG/3ML
2 VIAL, NEBULIZER (ML) INHALATION
Refills: 0 | DISCHARGE

## 2025-05-01 RX ORDER — DEXTROSE 50 % IN WATER 50 %
12.5 SYRINGE (ML) INTRAVENOUS ONCE
Refills: 0 | Status: DISCONTINUED | OUTPATIENT
Start: 2025-05-01 | End: 2025-05-02

## 2025-05-01 RX ORDER — ONDANSETRON HCL/PF 4 MG/2 ML
4 VIAL (ML) INJECTION ONCE
Refills: 0 | Status: DISCONTINUED | OUTPATIENT
Start: 2025-05-01 | End: 2025-05-01

## 2025-05-01 RX ORDER — ROSUVASTATIN CALCIUM 20 MG/1
1 TABLET, FILM COATED ORAL
Refills: 0 | DISCHARGE

## 2025-05-01 RX ORDER — OXYCODONE HYDROCHLORIDE 30 MG/1
5 TABLET ORAL ONCE
Refills: 0 | Status: DISCONTINUED | OUTPATIENT
Start: 2025-05-01 | End: 2025-05-01

## 2025-05-01 RX ORDER — MONTELUKAST SODIUM 10 MG/1
1 TABLET ORAL
Refills: 0 | DISCHARGE

## 2025-05-01 RX ORDER — LATANOPROST PF 0.05 MG/ML
1 SOLUTION/ DROPS OPHTHALMIC
Refills: 0 | DISCHARGE

## 2025-05-01 RX ORDER — TIMOLOL MALEATE 6.8 MG/ML
0.5 SOLUTION OPHTHALMIC
Refills: 0 | DISCHARGE

## 2025-05-01 RX ORDER — INSULIN LISPRO 100 U/ML
INJECTION, SOLUTION INTRAVENOUS; SUBCUTANEOUS AT BEDTIME
Refills: 0 | Status: DISCONTINUED | OUTPATIENT
Start: 2025-05-01 | End: 2025-05-02

## 2025-05-01 RX ORDER — DEXTROSE 50 % IN WATER 50 %
15 SYRINGE (ML) INTRAVENOUS ONCE
Refills: 0 | Status: DISCONTINUED | OUTPATIENT
Start: 2025-05-01 | End: 2025-05-02

## 2025-05-01 RX ORDER — KETOROLAC TROMETHAMINE 30 MG/ML
15 INJECTION, SOLUTION INTRAMUSCULAR; INTRAVENOUS EVERY 6 HOURS
Refills: 0 | Status: DISCONTINUED | OUTPATIENT
Start: 2025-05-01 | End: 2025-05-02

## 2025-05-01 RX ORDER — LIOTHYRONINE SODIUM 25 UG/1
10 TABLET ORAL
Refills: 0 | DISCHARGE

## 2025-05-01 RX ORDER — HYDROMORPHONE/SOD CHLOR,ISO/PF 2 MG/10 ML
0.25 SYRINGE (ML) INJECTION
Refills: 0 | Status: DISCONTINUED | OUTPATIENT
Start: 2025-05-01 | End: 2025-05-01

## 2025-05-01 RX ORDER — ACETAMINOPHEN 500 MG/5ML
975 LIQUID (ML) ORAL
Qty: 0 | Refills: 0 | DISCHARGE
Start: 2025-05-01

## 2025-05-01 RX ORDER — AMLODIPINE AND OLMESARTAN MEDOXOMIL 5; 40 MG/1; MG/1
1 TABLET ORAL
Refills: 0 | DISCHARGE

## 2025-05-01 RX ORDER — DAPAGLIFLOZIN 5 MG/1
1 TABLET, FILM COATED ORAL
Refills: 0 | DISCHARGE

## 2025-05-01 RX ORDER — METOPROLOL SUCCINATE 50 MG/1
1 TABLET, EXTENDED RELEASE ORAL
Refills: 0 | DISCHARGE

## 2025-05-01 RX ORDER — REPAGLINIDE 1 MG/1
2 TABLET ORAL
Refills: 0 | DISCHARGE

## 2025-05-01 RX ADMIN — Medication 400 MILLIGRAM(S): at 18:30

## 2025-05-01 RX ADMIN — KETOROLAC TROMETHAMINE 15 MILLIGRAM(S): 30 INJECTION, SOLUTION INTRAMUSCULAR; INTRAVENOUS at 19:30

## 2025-05-01 RX ADMIN — Medication 1 APPLICATION(S): at 09:26

## 2025-05-01 RX ADMIN — HEPARIN SODIUM 5000 UNIT(S): 1000 INJECTION INTRAVENOUS; SUBCUTANEOUS at 22:26

## 2025-05-01 RX ADMIN — TRAMADOL HYDROCHLORIDE 50 MILLIGRAM(S): 50 TABLET, FILM COATED ORAL at 09:27

## 2025-05-01 RX ADMIN — SODIUM CHLORIDE 30 MILLILITER(S): 9 INJECTION, SOLUTION INTRAVENOUS at 18:29

## 2025-05-01 RX ADMIN — KETOROLAC TROMETHAMINE 15 MILLIGRAM(S): 30 INJECTION, SOLUTION INTRAMUSCULAR; INTRAVENOUS at 21:07

## 2025-05-01 RX ADMIN — Medication 40 MILLIGRAM(S): at 09:27

## 2025-05-02 LAB
ANION GAP SERPL CALC-SCNC: 15 MMOL/L — HIGH (ref 7–14)
BLD GP AB SCN SERPL QL: NEGATIVE — SIGNIFICANT CHANGE UP
BUN SERPL-MCNC: 9 MG/DL — SIGNIFICANT CHANGE UP (ref 7–23)
CALCIUM SERPL-MCNC: 9.3 MG/DL — SIGNIFICANT CHANGE UP (ref 8.4–10.5)
CHLORIDE SERPL-SCNC: 104 MMOL/L — SIGNIFICANT CHANGE UP (ref 98–107)
CO2 SERPL-SCNC: 21 MMOL/L — LOW (ref 22–31)
CREAT SERPL-MCNC: 0.69 MG/DL — SIGNIFICANT CHANGE UP (ref 0.5–1.3)
EGFR: 92 ML/MIN/1.73M2 — SIGNIFICANT CHANGE UP
EGFR: 92 ML/MIN/1.73M2 — SIGNIFICANT CHANGE UP
GLUCOSE BLDC GLUCOMTR-MCNC: 151 MG/DL — HIGH (ref 70–99)
GLUCOSE BLDC GLUCOMTR-MCNC: 153 MG/DL — HIGH (ref 70–99)
GLUCOSE SERPL-MCNC: 149 MG/DL — HIGH (ref 70–99)
HCT VFR BLD CALC: 35.9 % — SIGNIFICANT CHANGE UP (ref 34.5–45)
HGB BLD-MCNC: 12.2 G/DL — SIGNIFICANT CHANGE UP (ref 11.5–15.5)
MCHC RBC-ENTMCNC: 29.3 PG — SIGNIFICANT CHANGE UP (ref 27–34)
MCHC RBC-ENTMCNC: 34 G/DL — SIGNIFICANT CHANGE UP (ref 32–36)
MCV RBC AUTO: 86.3 FL — SIGNIFICANT CHANGE UP (ref 80–100)
NRBC # BLD AUTO: 0 K/UL — SIGNIFICANT CHANGE UP (ref 0–0)
NRBC # FLD: 0 K/UL — SIGNIFICANT CHANGE UP (ref 0–0)
NRBC BLD AUTO-RTO: 0 /100 WBCS — SIGNIFICANT CHANGE UP (ref 0–0)
PLATELET # BLD AUTO: 255 K/UL — SIGNIFICANT CHANGE UP (ref 150–400)
POTASSIUM SERPL-MCNC: 4.1 MMOL/L — SIGNIFICANT CHANGE UP (ref 3.5–5.3)
POTASSIUM SERPL-SCNC: 4.1 MMOL/L — SIGNIFICANT CHANGE UP (ref 3.5–5.3)
RBC # BLD: 4.16 M/UL — SIGNIFICANT CHANGE UP (ref 3.8–5.2)
RBC # FLD: 12.2 % — SIGNIFICANT CHANGE UP (ref 10.3–14.5)
RH IG SCN BLD-IMP: POSITIVE — SIGNIFICANT CHANGE UP
SODIUM SERPL-SCNC: 140 MMOL/L — SIGNIFICANT CHANGE UP (ref 135–145)
WBC # BLD: 13.85 K/UL — HIGH (ref 3.8–10.5)
WBC # FLD AUTO: 13.85 K/UL — HIGH (ref 3.8–10.5)

## 2025-05-02 RX ORDER — ACETAMINOPHEN 500 MG/5ML
975 LIQUID (ML) ORAL EVERY 6 HOURS
Refills: 0 | Status: DISCONTINUED | OUTPATIENT
Start: 2025-05-02 | End: 2025-05-03

## 2025-05-02 RX ORDER — LEVOTHYROXINE SODIUM 300 MCG
100 TABLET ORAL EVERY 24 HOURS
Refills: 0 | Status: DISCONTINUED | OUTPATIENT
Start: 2025-05-02 | End: 2025-05-03

## 2025-05-02 RX ORDER — LATANOPROST PF 0.05 MG/ML
1 SOLUTION/ DROPS OPHTHALMIC AT BEDTIME
Refills: 0 | Status: DISCONTINUED | OUTPATIENT
Start: 2025-05-02 | End: 2025-05-03

## 2025-05-02 RX ORDER — REPAGLINIDE 1 MG/1
2 TABLET ORAL
Refills: 0 | Status: DISCONTINUED | OUTPATIENT
Start: 2025-05-02 | End: 2025-05-03

## 2025-05-02 RX ORDER — LIOTHYRONINE SODIUM 25 UG/1
10 TABLET ORAL EVERY 24 HOURS
Refills: 0 | Status: DISCONTINUED | OUTPATIENT
Start: 2025-05-02 | End: 2025-05-03

## 2025-05-02 RX ORDER — IBUPROFEN 200 MG
600 TABLET ORAL EVERY 6 HOURS
Refills: 0 | Status: DISCONTINUED | OUTPATIENT
Start: 2025-05-02 | End: 2025-05-03

## 2025-05-02 RX ORDER — DAPAGLIFLOZIN 5 MG/1
10 TABLET, FILM COATED ORAL EVERY 24 HOURS
Refills: 0 | Status: DISCONTINUED | OUTPATIENT
Start: 2025-05-02 | End: 2025-05-03

## 2025-05-02 RX ORDER — TIMOLOL MALEATE 6.8 MG/ML
1 SOLUTION OPHTHALMIC EVERY 24 HOURS
Refills: 0 | Status: DISCONTINUED | OUTPATIENT
Start: 2025-05-02 | End: 2025-05-03

## 2025-05-02 RX ADMIN — Medication 100 MICROGRAM(S): at 09:04

## 2025-05-02 RX ADMIN — Medication 3 MILLILITER(S): at 14:39

## 2025-05-02 RX ADMIN — HEPARIN SODIUM 5000 UNIT(S): 1000 INJECTION INTRAVENOUS; SUBCUTANEOUS at 14:36

## 2025-05-02 RX ADMIN — DAPAGLIFLOZIN 10 MILLIGRAM(S): 5 TABLET, FILM COATED ORAL at 10:54

## 2025-05-02 RX ADMIN — Medication 400 MILLIGRAM(S): at 05:58

## 2025-05-02 RX ADMIN — KETOROLAC TROMETHAMINE 15 MILLIGRAM(S): 30 INJECTION, SOLUTION INTRAMUSCULAR; INTRAVENOUS at 05:58

## 2025-05-02 RX ADMIN — Medication 1000 MILLIGRAM(S): at 06:26

## 2025-05-02 RX ADMIN — Medication 975 MILLIGRAM(S): at 13:30

## 2025-05-02 RX ADMIN — REPAGLINIDE 2 MILLIGRAM(S): 1 TABLET ORAL at 09:26

## 2025-05-02 RX ADMIN — Medication 975 MILLIGRAM(S): at 12:33

## 2025-05-02 RX ADMIN — LIOTHYRONINE SODIUM 10 MICROGRAM(S): 25 TABLET ORAL at 09:25

## 2025-05-02 RX ADMIN — Medication 600 MILLIGRAM(S): at 13:30

## 2025-05-02 RX ADMIN — Medication 600 MILLIGRAM(S): at 12:34

## 2025-05-02 RX ADMIN — TIMOLOL MALEATE 1 DROP(S): 6.8 SOLUTION OPHTHALMIC at 12:34

## 2025-05-02 RX ADMIN — KETOROLAC TROMETHAMINE 15 MILLIGRAM(S): 30 INJECTION, SOLUTION INTRAMUSCULAR; INTRAVENOUS at 06:27

## 2025-05-02 RX ADMIN — HEPARIN SODIUM 5000 UNIT(S): 1000 INJECTION INTRAVENOUS; SUBCUTANEOUS at 22:11

## 2025-05-02 RX ADMIN — HEPARIN SODIUM 5000 UNIT(S): 1000 INJECTION INTRAVENOUS; SUBCUTANEOUS at 06:02

## 2025-05-02 RX ADMIN — Medication 3 MILLILITER(S): at 02:57

## 2025-05-02 RX ADMIN — Medication 3 MILLILITER(S): at 08:35

## 2025-05-02 RX ADMIN — LATANOPROST PF 1 DROP(S): 0.05 SOLUTION/ DROPS OPHTHALMIC at 22:11

## 2025-05-03 ENCOUNTER — TRANSCRIPTION ENCOUNTER (OUTPATIENT)
Age: 75
End: 2025-05-03

## 2025-05-03 VITALS
TEMPERATURE: 98 F | HEART RATE: 83 BPM | OXYGEN SATURATION: 100 % | RESPIRATION RATE: 18 BRPM | DIASTOLIC BLOOD PRESSURE: 72 MMHG | SYSTOLIC BLOOD PRESSURE: 127 MMHG

## 2025-05-03 DIAGNOSIS — Z98.890 OTHER SPECIFIED POSTPROCEDURAL STATES: ICD-10-CM

## 2025-05-03 LAB
ANION GAP SERPL CALC-SCNC: 13 MMOL/L — SIGNIFICANT CHANGE UP (ref 7–14)
BASOPHILS # BLD AUTO: 0.04 K/UL — SIGNIFICANT CHANGE UP (ref 0–0.2)
BASOPHILS NFR BLD AUTO: 0.3 % — SIGNIFICANT CHANGE UP (ref 0–2)
BUN SERPL-MCNC: 17 MG/DL — SIGNIFICANT CHANGE UP (ref 7–23)
CALCIUM SERPL-MCNC: 8.8 MG/DL — SIGNIFICANT CHANGE UP (ref 8.4–10.5)
CHLORIDE SERPL-SCNC: 106 MMOL/L — SIGNIFICANT CHANGE UP (ref 98–107)
CO2 SERPL-SCNC: 21 MMOL/L — LOW (ref 22–31)
CREAT SERPL-MCNC: 0.9 MG/DL — SIGNIFICANT CHANGE UP (ref 0.5–1.3)
EGFR: 68 ML/MIN/1.73M2 — SIGNIFICANT CHANGE UP
EGFR: 68 ML/MIN/1.73M2 — SIGNIFICANT CHANGE UP
EOSINOPHIL # BLD AUTO: 0.09 K/UL — SIGNIFICANT CHANGE UP (ref 0–0.5)
EOSINOPHIL NFR BLD AUTO: 0.8 % — SIGNIFICANT CHANGE UP (ref 0–6)
GLUCOSE BLDC GLUCOMTR-MCNC: 101 MG/DL — HIGH (ref 70–99)
GLUCOSE BLDC GLUCOMTR-MCNC: 116 MG/DL — HIGH (ref 70–99)
GLUCOSE BLDC GLUCOMTR-MCNC: 133 MG/DL — HIGH (ref 70–99)
GLUCOSE BLDC GLUCOMTR-MCNC: 72 MG/DL — SIGNIFICANT CHANGE UP (ref 70–99)
GLUCOSE SERPL-MCNC: 112 MG/DL — HIGH (ref 70–99)
HCT VFR BLD CALC: 34.3 % — LOW (ref 34.5–45)
HGB BLD-MCNC: 11.5 G/DL — SIGNIFICANT CHANGE UP (ref 11.5–15.5)
IANC: 6.82 K/UL — SIGNIFICANT CHANGE UP (ref 1.8–7.4)
IMM GRANULOCYTES NFR BLD AUTO: 0.4 % — SIGNIFICANT CHANGE UP (ref 0–0.9)
LYMPHOCYTES # BLD AUTO: 3.82 K/UL — HIGH (ref 1–3.3)
LYMPHOCYTES # BLD AUTO: 32.8 % — SIGNIFICANT CHANGE UP (ref 13–44)
MCHC RBC-ENTMCNC: 29.6 PG — SIGNIFICANT CHANGE UP (ref 27–34)
MCHC RBC-ENTMCNC: 33.5 G/DL — SIGNIFICANT CHANGE UP (ref 32–36)
MCV RBC AUTO: 88.2 FL — SIGNIFICANT CHANGE UP (ref 80–100)
MONOCYTES # BLD AUTO: 0.84 K/UL — SIGNIFICANT CHANGE UP (ref 0–0.9)
MONOCYTES NFR BLD AUTO: 7.2 % — SIGNIFICANT CHANGE UP (ref 2–14)
NEUTROPHILS # BLD AUTO: 6.82 K/UL — SIGNIFICANT CHANGE UP (ref 1.8–7.4)
NEUTROPHILS NFR BLD AUTO: 58.5 % — SIGNIFICANT CHANGE UP (ref 43–77)
NRBC # BLD AUTO: 0 K/UL — SIGNIFICANT CHANGE UP (ref 0–0)
NRBC # FLD: 0 K/UL — SIGNIFICANT CHANGE UP (ref 0–0)
NRBC BLD AUTO-RTO: 0 /100 WBCS — SIGNIFICANT CHANGE UP (ref 0–0)
PLATELET # BLD AUTO: 251 K/UL — SIGNIFICANT CHANGE UP (ref 150–400)
POTASSIUM SERPL-MCNC: 3.7 MMOL/L — SIGNIFICANT CHANGE UP (ref 3.5–5.3)
POTASSIUM SERPL-SCNC: 3.7 MMOL/L — SIGNIFICANT CHANGE UP (ref 3.5–5.3)
RBC # BLD: 3.89 M/UL — SIGNIFICANT CHANGE UP (ref 3.8–5.2)
RBC # FLD: 12.7 % — SIGNIFICANT CHANGE UP (ref 10.3–14.5)
SODIUM SERPL-SCNC: 140 MMOL/L — SIGNIFICANT CHANGE UP (ref 135–145)
WBC # BLD: 11.66 K/UL — HIGH (ref 3.8–10.5)
WBC # FLD AUTO: 11.66 K/UL — HIGH (ref 3.8–10.5)

## 2025-05-03 RX ORDER — ENOXAPARIN SODIUM 100 MG/ML
40 INJECTION SUBCUTANEOUS EVERY 24 HOURS
Refills: 0 | Status: DISCONTINUED | OUTPATIENT
Start: 2025-05-03 | End: 2025-05-03

## 2025-05-03 RX ORDER — OXYCODONE HYDROCHLORIDE 30 MG/1
1 TABLET ORAL
Qty: 8 | Refills: 0
Start: 2025-05-03

## 2025-05-03 RX ADMIN — Medication 975 MILLIGRAM(S): at 01:05

## 2025-05-03 RX ADMIN — Medication 975 MILLIGRAM(S): at 17:28

## 2025-05-03 RX ADMIN — Medication 600 MILLIGRAM(S): at 17:28

## 2025-05-03 RX ADMIN — ENOXAPARIN SODIUM 40 MILLIGRAM(S): 100 INJECTION SUBCUTANEOUS at 11:38

## 2025-05-03 RX ADMIN — Medication 975 MILLIGRAM(S): at 01:55

## 2025-05-03 RX ADMIN — Medication 3 MILLILITER(S): at 01:56

## 2025-05-03 RX ADMIN — REPAGLINIDE 2 MILLIGRAM(S): 1 TABLET ORAL at 08:22

## 2025-05-03 RX ADMIN — LIOTHYRONINE SODIUM 10 MICROGRAM(S): 25 TABLET ORAL at 11:03

## 2025-05-03 RX ADMIN — DAPAGLIFLOZIN 10 MILLIGRAM(S): 5 TABLET, FILM COATED ORAL at 11:01

## 2025-05-03 RX ADMIN — Medication 3 MILLILITER(S): at 16:47

## 2025-05-03 RX ADMIN — Medication 3 MILLILITER(S): at 05:41

## 2025-05-03 RX ADMIN — TIMOLOL MALEATE 1 DROP(S): 6.8 SOLUTION OPHTHALMIC at 11:38

## 2025-05-03 RX ADMIN — HEPARIN SODIUM 5000 UNIT(S): 1000 INJECTION INTRAVENOUS; SUBCUTANEOUS at 05:35

## 2025-05-03 RX ADMIN — Medication 600 MILLIGRAM(S): at 01:05

## 2025-05-05 ENCOUNTER — NON-APPOINTMENT (OUTPATIENT)
Age: 75
End: 2025-05-05

## 2025-05-14 ENCOUNTER — NON-APPOINTMENT (OUTPATIENT)
Age: 75
End: 2025-05-14

## 2025-05-14 LAB — SURGICAL PATHOLOGY STUDY: SIGNIFICANT CHANGE UP

## 2025-05-17 PROBLEM — C80.1 CARCINOSARCOMA: Status: ACTIVE | Noted: 2025-05-17

## 2025-05-19 ENCOUNTER — APPOINTMENT (OUTPATIENT)
Dept: GYNECOLOGIC ONCOLOGY | Facility: CLINIC | Age: 75
End: 2025-05-19
Payer: MEDICARE

## 2025-05-19 ENCOUNTER — NON-APPOINTMENT (OUTPATIENT)
Age: 75
End: 2025-05-19

## 2025-05-19 VITALS
HEIGHT: 62 IN | BODY MASS INDEX: 28.52 KG/M2 | TEMPERATURE: 97.8 F | WEIGHT: 155 LBS | RESPIRATION RATE: 16 BRPM | SYSTOLIC BLOOD PRESSURE: 129 MMHG | HEART RATE: 88 BPM | OXYGEN SATURATION: 95 % | DIASTOLIC BLOOD PRESSURE: 81 MMHG

## 2025-05-19 DIAGNOSIS — C80.1 MALIGNANT (PRIMARY) NEOPLASM, UNSPECIFIED: ICD-10-CM

## 2025-05-19 PROCEDURE — 99024 POSTOP FOLLOW-UP VISIT: CPT

## 2025-05-21 ENCOUNTER — NON-APPOINTMENT (OUTPATIENT)
Age: 75
End: 2025-05-21

## 2025-05-27 PROBLEM — J45.909 UNSPECIFIED ASTHMA, UNCOMPLICATED: Chronic | Status: ACTIVE | Noted: 2025-04-25

## 2025-05-27 PROBLEM — N95.0 POSTMENOPAUSAL BLEEDING: Chronic | Status: ACTIVE | Noted: 2025-04-25

## 2025-05-27 PROBLEM — N85.2 HYPERTROPHY OF UTERUS: Chronic | Status: ACTIVE | Noted: 2025-04-25

## 2025-06-04 ENCOUNTER — APPOINTMENT (OUTPATIENT)
Dept: RADIATION ONCOLOGY | Facility: CLINIC | Age: 75
End: 2025-06-04
Payer: MEDICARE

## 2025-06-04 VITALS
HEART RATE: 86 BPM | HEIGHT: 62 IN | DIASTOLIC BLOOD PRESSURE: 80 MMHG | TEMPERATURE: 97.16 F | OXYGEN SATURATION: 100 % | RESPIRATION RATE: 16 BRPM | SYSTOLIC BLOOD PRESSURE: 120 MMHG

## 2025-06-04 PROCEDURE — 99204 OFFICE O/P NEW MOD 45 MIN: CPT

## 2025-06-04 RX ORDER — REPAGLINIDE 1 MG/1
1 TABLET ORAL
Refills: 0 | Status: ACTIVE | COMMUNITY
Start: 2025-06-04

## 2025-06-04 RX ORDER — AMLODIPINE BESYLATE 10 MG/1
10 TABLET ORAL
Refills: 0 | Status: ACTIVE | COMMUNITY
Start: 2025-06-04

## 2025-06-04 RX ORDER — DAPAGLIFLOZIN 10 MG/1
10 TABLET, FILM COATED ORAL
Refills: 0 | Status: ACTIVE | COMMUNITY
Start: 2025-06-04

## 2025-06-04 RX ORDER — OLMESARTAN MEDOXOMIL 40 MG/1
40 TABLET, FILM COATED ORAL
Refills: 0 | Status: ACTIVE | COMMUNITY
Start: 2025-06-04

## 2025-06-05 ENCOUNTER — OUTPATIENT (OUTPATIENT)
Dept: OUTPATIENT SERVICES | Facility: HOSPITAL | Age: 75
LOS: 1 days | Discharge: HOME | End: 2025-06-05

## 2025-06-05 DIAGNOSIS — Z96.641 PRESENCE OF RIGHT ARTIFICIAL HIP JOINT: Chronic | ICD-10-CM

## 2025-06-05 DIAGNOSIS — Z98.890 OTHER SPECIFIED POSTPROCEDURAL STATES: Chronic | ICD-10-CM

## 2025-06-05 DIAGNOSIS — C80.1 MALIGNANT (PRIMARY) NEOPLASM, UNSPECIFIED: ICD-10-CM

## 2025-06-06 ENCOUNTER — APPOINTMENT (OUTPATIENT)
Dept: HEMATOLOGY ONCOLOGY | Facility: CLINIC | Age: 75
End: 2025-06-06
Payer: MEDICARE

## 2025-06-06 VITALS
TEMPERATURE: 97.2 F | BODY MASS INDEX: 28.56 KG/M2 | DIASTOLIC BLOOD PRESSURE: 71 MMHG | RESPIRATION RATE: 16 BRPM | WEIGHT: 155.2 LBS | HEIGHT: 62 IN | HEART RATE: 84 BPM | SYSTOLIC BLOOD PRESSURE: 113 MMHG | OXYGEN SATURATION: 96 %

## 2025-06-06 PROCEDURE — 99205 OFFICE O/P NEW HI 60 MIN: CPT

## 2025-06-06 RX ORDER — SEMAGLUTIDE 1.34 MG/ML
4 INJECTION, SOLUTION SUBCUTANEOUS
Refills: 0 | Status: ACTIVE | COMMUNITY
Start: 2025-06-04

## 2025-06-06 RX ORDER — LIOTHYRONINE SODIUM 5 UG/1
5 TABLET ORAL DAILY
Refills: 0 | Status: ACTIVE | COMMUNITY
Start: 2025-06-06

## 2025-06-08 PROBLEM — C55 CARCINOSARCOMA OF UTERUS: Status: ACTIVE | Noted: 2025-06-08

## 2025-06-09 ENCOUNTER — APPOINTMENT (OUTPATIENT)
Dept: ORTHOPEDIC SURGERY | Facility: CLINIC | Age: 75
End: 2025-06-09
Payer: MEDICARE

## 2025-06-09 PROCEDURE — 20610 DRAIN/INJ JOINT/BURSA W/O US: CPT | Mod: LT

## 2025-06-09 PROCEDURE — 73502 X-RAY EXAM HIP UNI 2-3 VIEWS: CPT | Mod: RT

## 2025-06-09 PROCEDURE — 99214 OFFICE O/P EST MOD 30 MIN: CPT | Mod: 25

## 2025-08-11 ENCOUNTER — APPOINTMENT (OUTPATIENT)
Dept: ORTHOPEDIC SURGERY | Facility: CLINIC | Age: 75
End: 2025-08-11

## (undated) DEVICE — DRAPE INSTRUMENT POUCH 6.75" X 11"

## (undated) DEVICE — SUT POLYSORB 2-0 36" GS-21 UNDYED

## (undated) DEVICE — POSITIONER FOAM HEADREST (PINK)

## (undated) DEVICE — SAW BLADE STRYKER RECIPROCATING 77.6X0.77X11.2MM

## (undated) DEVICE — DRAPE WARMING SOLUTION 44 X 44"

## (undated) DEVICE — SUT VICRYL 2-0 27" CT-2 UNDYED

## (undated) DEVICE — SYR ASEPTO

## (undated) DEVICE — Device

## (undated) DEVICE — SUT POLYSORB 0 30" GS-21 UNDYED

## (undated) DEVICE — VENODYNE/SCD SLEEVE CALF MEDIUM

## (undated) DEVICE — SOL IRR BAG NS 0.9% 3000ML

## (undated) DEVICE — SOL IRR POUR H2O 1000ML

## (undated) DEVICE — PACK MAJOR ABDOMINAL WITH LAP

## (undated) DEVICE — SOL IRR BAG H2O 1000ML

## (undated) DEVICE — PREP TRAY DRY SKIN PREP SCRUB

## (undated) DEVICE — ELCTR ROCKER SWITCH PENCIL BLUE 10FT

## (undated) DEVICE — DRSG DERMABOND 0.7ML

## (undated) DEVICE — DRSG TELFA 3 X 8

## (undated) DEVICE — GLV 8 PROTEXIS (WHITE)

## (undated) DEVICE — BASIN SET SINGLE

## (undated) DEVICE — DRAPE 3/4 SHEET 52X76"

## (undated) DEVICE — SUT VICRYL 2-0 27" SH UNDYED

## (undated) DEVICE — POSITIONER FOAM EGG CRATE ULNAR 2PCS (PINK)

## (undated) DEVICE — TUBING TUR 2 PRONG

## (undated) DEVICE — ELCTR BOVIE PENCIL SMOKE EVACUATION

## (undated) DEVICE — PACK TOTAL HIP (2 PACKS)

## (undated) DEVICE — SAW BLADE STRYKER RECIPROCATING DOUBLE SIDED 70X12.5X1MM

## (undated) DEVICE — SOL IRR POUR NS 0.9% 500ML

## (undated) DEVICE — POSITIONER FOAM ABDUCTION PILLOW MED (PINK)

## (undated) DEVICE — FOLEY TRAY 16FR 5CC LF UMETER CLOSED

## (undated) DEVICE — LAP PAD W RING 18 X 18"

## (undated) DEVICE — MEDICINE CUP WITH LID 60ML

## (undated) DEVICE — WARMING BLANKET UPPER ADULT

## (undated) DEVICE — LIGASURE IMPACT

## (undated) DEVICE — SUT VICRYL 1 18" TIES UNDYED

## (undated) DEVICE — SUT VICRYL 1 36" CT-1 UNDYED

## (undated) DEVICE — DRAPE MAGNETIC INSTRUMENT MEDIUM

## (undated) DEVICE — GLV 8.5 PROTEXIS (WHITE)

## (undated) DEVICE — DRILL BIT STRYKER ORTHO 4X25MM

## (undated) DEVICE — DRAPE HIP W POUCHES 87X115X134"

## (undated) DEVICE — SUT POLYSORB 1 36" GS-21 UNDYED

## (undated) DEVICE — SUCTION YANKAUER NO CONTROL VENT

## (undated) DEVICE — SPECIMEN CONTAINER 100ML

## (undated) DEVICE — SUT QUILL MONODERM 3-0 30CM PS-2

## (undated) DEVICE — ELCTR REM POLYHESIVE ADULT PT RETURN 15FT

## (undated) DEVICE — DRSG DERMABOND PRINEO 42CM

## (undated) DEVICE — DRSG AQUACEL 3.5 X 10"

## (undated) DEVICE — DRAPE SPY PHI

## (undated) DEVICE — SUT MAXON 1 60" T-60

## (undated) DEVICE — SUT MONOCRYL 3-0 18" PS-2 UNDYED

## (undated) DEVICE — GOWN LG

## (undated) DEVICE — GLV 7.5 PROTEXIS (BLUE)